# Patient Record
Sex: FEMALE | Race: WHITE | NOT HISPANIC OR LATINO
[De-identification: names, ages, dates, MRNs, and addresses within clinical notes are randomized per-mention and may not be internally consistent; named-entity substitution may affect disease eponyms.]

---

## 2017-01-11 ENCOUNTER — APPOINTMENT (OUTPATIENT)
Dept: INTERNAL MEDICINE | Facility: CLINIC | Age: 67
End: 2017-01-11

## 2017-01-11 VITALS
BODY MASS INDEX: 22.45 KG/M2 | DIASTOLIC BLOOD PRESSURE: 78 MMHG | HEIGHT: 62 IN | TEMPERATURE: 97.8 F | SYSTOLIC BLOOD PRESSURE: 136 MMHG | OXYGEN SATURATION: 92 % | HEART RATE: 74 BPM | WEIGHT: 122 LBS

## 2017-04-17 ENCOUNTER — APPOINTMENT (OUTPATIENT)
Dept: INTERNAL MEDICINE | Facility: CLINIC | Age: 67
End: 2017-04-17

## 2017-04-17 VITALS
BODY MASS INDEX: 20.61 KG/M2 | WEIGHT: 112 LBS | SYSTOLIC BLOOD PRESSURE: 132 MMHG | HEART RATE: 70 BPM | HEIGHT: 62 IN | TEMPERATURE: 98.5 F | OXYGEN SATURATION: 92 % | DIASTOLIC BLOOD PRESSURE: 79 MMHG

## 2017-04-17 DIAGNOSIS — N39.0 URINARY TRACT INFECTION, SITE NOT SPECIFIED: ICD-10-CM

## 2017-04-17 DIAGNOSIS — R10.30 LOWER ABDOMINAL PAIN, UNSPECIFIED: ICD-10-CM

## 2017-04-17 RX ORDER — AZITHROMYCIN 250 MG/1
250 TABLET, FILM COATED ORAL
Qty: 6 | Refills: 2 | Status: DISCONTINUED | COMMUNITY
Start: 2017-01-11 | End: 2017-04-17

## 2017-04-18 LAB
ALBUMIN SERPL ELPH-MCNC: 5 G/DL
ALP BLD-CCNC: 76 U/L
ALT SERPL-CCNC: 19 U/L
ANION GAP SERPL CALC-SCNC: 16 MMOL/L
AST SERPL-CCNC: 21 U/L
BASOPHILS # BLD AUTO: 0.04 K/UL
BASOPHILS NFR BLD AUTO: 0.5 %
BILIRUB SERPL-MCNC: 0.4 MG/DL
BUN SERPL-MCNC: 32 MG/DL
CALCIUM SERPL-MCNC: 9.4 MG/DL
CHLORIDE SERPL-SCNC: 103 MMOL/L
CO2 SERPL-SCNC: 24 MMOL/L
CREAT SERPL-MCNC: 0.78 MG/DL
CRP SERPL-MCNC: <0.2 MG/DL
EOSINOPHIL # BLD AUTO: 0.1 K/UL
EOSINOPHIL NFR BLD AUTO: 1.2 %
ERYTHROCYTE [SEDIMENTATION RATE] IN BLOOD BY WESTERGREN METHOD: 5 MM/HR
GLUCOSE SERPL-MCNC: 97 MG/DL
HCT VFR BLD CALC: 40.6 %
HGB BLD-MCNC: 13.7 G/DL
IMM GRANULOCYTES NFR BLD AUTO: 0.2 %
LYMPHOCYTES # BLD AUTO: 2.54 K/UL
LYMPHOCYTES NFR BLD AUTO: 29.8 %
MAN DIFF?: NORMAL
MCHC RBC-ENTMCNC: 32.6 PG
MCHC RBC-ENTMCNC: 33.7 GM/DL
MCV RBC AUTO: 96.7 FL
MONOCYTES # BLD AUTO: 0.57 K/UL
MONOCYTES NFR BLD AUTO: 6.7 %
NEUTROPHILS # BLD AUTO: 5.25 K/UL
NEUTROPHILS NFR BLD AUTO: 61.6 %
PLATELET # BLD AUTO: 240 K/UL
POTASSIUM SERPL-SCNC: 4.5 MMOL/L
PROT SERPL-MCNC: 6.7 G/DL
RBC # BLD: 4.2 M/UL
RBC # FLD: 12.1 %
SODIUM SERPL-SCNC: 143 MMOL/L
WBC # FLD AUTO: 8.52 K/UL

## 2017-05-11 ENCOUNTER — APPOINTMENT (OUTPATIENT)
Dept: OPHTHALMOLOGY | Facility: CLINIC | Age: 67
End: 2017-05-11

## 2017-06-07 ENCOUNTER — APPOINTMENT (OUTPATIENT)
Dept: OPHTHALMOLOGY | Facility: CLINIC | Age: 67
End: 2017-06-07

## 2017-06-12 ENCOUNTER — APPOINTMENT (OUTPATIENT)
Dept: ULTRASOUND IMAGING | Facility: IMAGING CENTER | Age: 67
End: 2017-06-12

## 2017-06-12 ENCOUNTER — APPOINTMENT (OUTPATIENT)
Dept: MAMMOGRAPHY | Facility: IMAGING CENTER | Age: 67
End: 2017-06-12

## 2017-06-12 ENCOUNTER — OUTPATIENT (OUTPATIENT)
Dept: OUTPATIENT SERVICES | Facility: HOSPITAL | Age: 67
LOS: 1 days | End: 2017-06-12
Payer: COMMERCIAL

## 2017-06-12 DIAGNOSIS — Z01.419 ENCOUNTER FOR GYNECOLOGICAL EXAMINATION (GENERAL) (ROUTINE) WITHOUT ABNORMAL FINDINGS: ICD-10-CM

## 2017-06-12 PROCEDURE — 77067 SCR MAMMO BI INCL CAD: CPT

## 2017-06-12 PROCEDURE — 77063 BREAST TOMOSYNTHESIS BI: CPT

## 2017-06-12 PROCEDURE — 76641 ULTRASOUND BREAST COMPLETE: CPT

## 2017-09-14 ENCOUNTER — APPOINTMENT (OUTPATIENT)
Dept: OPHTHALMOLOGY | Facility: CLINIC | Age: 67
End: 2017-09-14
Payer: COMMERCIAL

## 2017-09-14 DIAGNOSIS — H52.13 MYOPIA, BILATERAL: ICD-10-CM

## 2017-09-14 DIAGNOSIS — Z97.3 PRESENCE OF SPECTACLES AND CONTACT LENSES: ICD-10-CM

## 2017-09-14 DIAGNOSIS — H04.123 DRY EYE SYNDROME OF BILATERAL LACRIMAL GLANDS: ICD-10-CM

## 2017-09-14 PROCEDURE — 92012 INTRM OPH EXAM EST PATIENT: CPT

## 2017-09-14 PROCEDURE — 92285 EXTERNAL OCULAR PHOTOGRAPHY: CPT

## 2017-10-04 ENCOUNTER — APPOINTMENT (OUTPATIENT)
Dept: OPHTHALMOLOGY | Facility: CLINIC | Age: 67
End: 2017-10-04
Payer: COMMERCIAL

## 2017-10-04 PROCEDURE — 92012 INTRM OPH EXAM EST PATIENT: CPT

## 2018-03-08 ENCOUNTER — APPOINTMENT (OUTPATIENT)
Dept: OPHTHALMOLOGY | Facility: CLINIC | Age: 68
End: 2018-03-08
Payer: COMMERCIAL

## 2018-03-08 PROCEDURE — 92012 INTRM OPH EXAM EST PATIENT: CPT

## 2018-06-27 ENCOUNTER — APPOINTMENT (OUTPATIENT)
Dept: MAMMOGRAPHY | Facility: IMAGING CENTER | Age: 68
End: 2018-06-27
Payer: COMMERCIAL

## 2018-06-27 ENCOUNTER — APPOINTMENT (OUTPATIENT)
Dept: ULTRASOUND IMAGING | Facility: IMAGING CENTER | Age: 68
End: 2018-06-27
Payer: COMMERCIAL

## 2018-06-27 ENCOUNTER — OUTPATIENT (OUTPATIENT)
Dept: OUTPATIENT SERVICES | Facility: HOSPITAL | Age: 68
LOS: 1 days | End: 2018-06-27
Payer: COMMERCIAL

## 2018-06-27 DIAGNOSIS — Z00.8 ENCOUNTER FOR OTHER GENERAL EXAMINATION: ICD-10-CM

## 2018-06-27 PROCEDURE — 77067 SCR MAMMO BI INCL CAD: CPT | Mod: 26

## 2018-06-27 PROCEDURE — 77063 BREAST TOMOSYNTHESIS BI: CPT | Mod: 26

## 2018-06-27 PROCEDURE — 77067 SCR MAMMO BI INCL CAD: CPT

## 2018-06-27 PROCEDURE — 76641 ULTRASOUND BREAST COMPLETE: CPT | Mod: 26,50

## 2018-06-27 PROCEDURE — 76641 ULTRASOUND BREAST COMPLETE: CPT

## 2018-06-27 PROCEDURE — 77063 BREAST TOMOSYNTHESIS BI: CPT

## 2018-10-16 ENCOUNTER — APPOINTMENT (OUTPATIENT)
Dept: INTERNAL MEDICINE | Facility: CLINIC | Age: 68
End: 2018-10-16
Payer: COMMERCIAL

## 2018-10-16 VITALS
DIASTOLIC BLOOD PRESSURE: 75 MMHG | OXYGEN SATURATION: 97 % | TEMPERATURE: 98.4 F | BODY MASS INDEX: 21.71 KG/M2 | HEART RATE: 66 BPM | SYSTOLIC BLOOD PRESSURE: 128 MMHG | WEIGHT: 118 LBS | HEIGHT: 62 IN

## 2018-10-16 PROCEDURE — 99214 OFFICE O/P EST MOD 30 MIN: CPT

## 2018-10-16 NOTE — PLAN
[FreeTextEntry1] : URI\par -start zpak, take with meals and also may use probiotics to help with any GI effects from med\par -hand hygeine, rest, motrin/tylenol for symptomatic relief\par -rest and hydration\par \par Conjunctivitis\par -antibiotic eyedrops for 5 days\par -avoid wearing contacts

## 2018-10-16 NOTE — PHYSICAL EXAM
[No Acute Distress] : no acute distress [Well Nourished] : well nourished [PERRL] : pupils equal round and reactive to light [Normal Outer Ear/Nose] : the outer ears and nose were normal in appearance [Normal Oropharynx] : the oropharynx was normal [Supple] : supple [No Respiratory Distress] : no respiratory distress  [Clear to Auscultation] : lungs were clear to auscultation bilaterally [No Accessory Muscle Use] : no accessory muscle use [Normal Rate] : normal rate  [Regular Rhythm] : with a regular rhythm [No Focal Deficits] : no focal deficits [de-identified] : bilateral injected conjunctiva

## 2018-10-16 NOTE — REVIEW OF SYSTEMS
[Fever] : no fever [Chills] : chills [Fatigue] : no fatigue [Discharge] : discharge [Redness] : redness [Earache] : earache [Nasal Discharge] : nasal discharge [Sore Throat] : sore throat [Chest Pain] : no chest pain [Palpitations] : no palpitations [Shortness Of Breath] : no shortness of breath [Wheezing] : no wheezing [Cough] : cough [Abdominal Pain] : no abdominal pain [Nausea] : no nausea [Constipation] : no constipation [Diarrhea] : diarrhea [Vomiting] : no vomiting [Headache] : no headache [Dizziness] : no dizziness

## 2018-10-16 NOTE — HISTORY OF PRESENT ILLNESS
[FreeTextEntry8] : Patient presents with dry cough, nasal congestion, and chest congestion for the past week. She states that the symptoms, especially cough are getting worse. She admits to feeling chills last night. She used OTC claritin with mild sympotmatic relief. Also now complains of a little sore throat from the coughing. Denies sick contacts. States that she had her makeup done last week and they used false eyelashes with glue which has caused her eyes to become red. This morning she woke up with crusted eyes and they continue to ooze discharge.

## 2019-04-22 ENCOUNTER — APPOINTMENT (OUTPATIENT)
Dept: INTERNAL MEDICINE | Facility: CLINIC | Age: 69
End: 2019-04-22
Payer: COMMERCIAL

## 2019-04-22 ENCOUNTER — NON-APPOINTMENT (OUTPATIENT)
Age: 69
End: 2019-04-22

## 2019-04-22 VITALS
HEART RATE: 72 BPM | SYSTOLIC BLOOD PRESSURE: 118 MMHG | WEIGHT: 118 LBS | DIASTOLIC BLOOD PRESSURE: 78 MMHG | BODY MASS INDEX: 21.71 KG/M2 | HEIGHT: 62 IN

## 2019-04-22 DIAGNOSIS — Z86.69 PERSONAL HISTORY OF OTHER DISEASES OF THE NERVOUS SYSTEM AND SENSE ORGANS: ICD-10-CM

## 2019-04-22 DIAGNOSIS — H00.11 CHALAZION RIGHT UPPER EYELID: ICD-10-CM

## 2019-04-22 DIAGNOSIS — M25.512 PAIN IN RIGHT SHOULDER: ICD-10-CM

## 2019-04-22 DIAGNOSIS — H40.003 PREGLAUCOMA, UNSPECIFIED, BILATERAL: ICD-10-CM

## 2019-04-22 DIAGNOSIS — Z01.818 ENCOUNTER FOR OTHER PREPROCEDURAL EXAMINATION: ICD-10-CM

## 2019-04-22 DIAGNOSIS — M25.511 PAIN IN RIGHT SHOULDER: ICD-10-CM

## 2019-04-22 DIAGNOSIS — H00.14 CHALAZION LEFT UPPER EYELID: ICD-10-CM

## 2019-04-22 DIAGNOSIS — Z41.1 ENCOUNTER FOR COSMETIC SURGERY: ICD-10-CM

## 2019-04-22 PROCEDURE — 36415 COLL VENOUS BLD VENIPUNCTURE: CPT

## 2019-04-22 PROCEDURE — 99214 OFFICE O/P EST MOD 30 MIN: CPT | Mod: 25

## 2019-04-22 PROCEDURE — 93000 ELECTROCARDIOGRAM COMPLETE: CPT

## 2019-04-22 NOTE — HISTORY OF PRESENT ILLNESS
[No Pertinent Cardiac History] : no history of aortic stenosis, atrial fibrillation, coronary artery disease, recent myocardial infarction, or implantable device/pacemaker [Aortic Stenosis] : no aortic stenosis [Coronary Artery Disease] : no coronary artery disease [No Pertinent Pulmonary History] : no history of asthma, COPD, sleep apnea, or smoking [No Adverse Anesthesia Reaction] : no adverse anesthesia reaction in self or family member [Chronic Kidney Disease] : no chronic kidney disease [Chronic Anticoagulation] : no chronic anticoagulation [FreeTextEntry1] : eye.facial [Diabetes] : no diabetes [FreeTextEntry2] : 5/14 [FreeTextEntry3] : Dr Santos [FreeTextEntry4] : Having eye/facial surgery

## 2019-04-22 NOTE — PHYSICAL EXAM
[No Acute Distress] : no acute distress [Well Nourished] : well nourished [No JVD] : no jugular venous distention [Supple] : supple [No Respiratory Distress] : no respiratory distress  [Clear to Auscultation] : lungs were clear to auscultation bilaterally [Normal Rate] : normal rate  [Regular Rhythm] : with a regular rhythm [No Extremity Clubbing/Cyanosis] : no extremity clubbing/cyanosis [No Edema] : there was no peripheral edema [No HSM] : no HSM [Normal Bowel Sounds] : normal bowel sounds

## 2019-04-22 NOTE — ASSESSMENT
[No Further Testing Recommended] : no further testing recommended [Patient Optimized for Surgery] : Patient optimized for surgery

## 2019-04-22 NOTE — REVIEW OF SYSTEMS
[Fever] : no fever [Night Sweats] : no night sweats [Nasal Discharge] : no nasal discharge [Earache] : no earache [Chest Pain] : no chest pain [Orthopnea] : no orthopnea [Shortness Of Breath] : no shortness of breath [Wheezing] : no wheezing [Abdominal Pain] : no abdominal pain [Dysuria] : no dysuria [Vomiting] : no vomiting

## 2019-04-23 LAB
ALBUMIN SERPL ELPH-MCNC: 4.7 G/DL
ALP BLD-CCNC: 91 U/L
ALT SERPL-CCNC: 16 U/L
ANION GAP SERPL CALC-SCNC: 14 MMOL/L
AST SERPL-CCNC: 17 U/L
BASOPHILS # BLD AUTO: 0.06 K/UL
BASOPHILS NFR BLD AUTO: 0.8 %
BILIRUB SERPL-MCNC: 0.3 MG/DL
BUN SERPL-MCNC: 20 MG/DL
CALCIUM SERPL-MCNC: 9.4 MG/DL
CHLORIDE SERPL-SCNC: 104 MMOL/L
CO2 SERPL-SCNC: 26 MMOL/L
CREAT SERPL-MCNC: 0.85 MG/DL
EOSINOPHIL # BLD AUTO: 0.11 K/UL
EOSINOPHIL NFR BLD AUTO: 1.4 %
ESTIMATED AVERAGE GLUCOSE: 114 MG/DL
GLUCOSE SERPL-MCNC: 94 MG/DL
HBA1C MFR BLD HPLC: 5.6 %
HCT VFR BLD CALC: 40.2 %
HGB BLD-MCNC: 13.1 G/DL
IMM GRANULOCYTES NFR BLD AUTO: 0.5 %
LYMPHOCYTES # BLD AUTO: 2.24 K/UL
LYMPHOCYTES NFR BLD AUTO: 28.5 %
MAN DIFF?: NORMAL
MCHC RBC-ENTMCNC: 31.9 PG
MCHC RBC-ENTMCNC: 32.6 GM/DL
MCV RBC AUTO: 97.8 FL
MONOCYTES # BLD AUTO: 0.74 K/UL
MONOCYTES NFR BLD AUTO: 9.4 %
NEUTROPHILS # BLD AUTO: 4.67 K/UL
NEUTROPHILS NFR BLD AUTO: 59.4 %
PLATELET # BLD AUTO: 229 K/UL
POTASSIUM SERPL-SCNC: 4.3 MMOL/L
PROT SERPL-MCNC: 6.7 G/DL
RBC # BLD: 4.11 M/UL
RBC # FLD: 12.1 %
SODIUM SERPL-SCNC: 144 MMOL/L
TSH SERPL-ACNC: 2.45 UIU/ML
WBC # FLD AUTO: 7.86 K/UL

## 2019-05-14 ENCOUNTER — OUTPATIENT (OUTPATIENT)
Dept: OUTPATIENT SERVICES | Facility: HOSPITAL | Age: 69
LOS: 1 days | Discharge: HOME | End: 2019-05-14

## 2019-06-28 ENCOUNTER — APPOINTMENT (OUTPATIENT)
Dept: MAMMOGRAPHY | Facility: IMAGING CENTER | Age: 69
End: 2019-06-28
Payer: COMMERCIAL

## 2019-06-28 ENCOUNTER — OUTPATIENT (OUTPATIENT)
Dept: OUTPATIENT SERVICES | Facility: HOSPITAL | Age: 69
LOS: 1 days | End: 2019-06-28
Payer: COMMERCIAL

## 2019-06-28 ENCOUNTER — APPOINTMENT (OUTPATIENT)
Dept: ULTRASOUND IMAGING | Facility: IMAGING CENTER | Age: 69
End: 2019-06-28
Payer: COMMERCIAL

## 2019-06-28 DIAGNOSIS — Z00.8 ENCOUNTER FOR OTHER GENERAL EXAMINATION: ICD-10-CM

## 2019-06-28 PROCEDURE — 77067 SCR MAMMO BI INCL CAD: CPT

## 2019-06-28 PROCEDURE — 77063 BREAST TOMOSYNTHESIS BI: CPT | Mod: 26

## 2019-06-28 PROCEDURE — 76641 ULTRASOUND BREAST COMPLETE: CPT | Mod: 26,50

## 2019-06-28 PROCEDURE — 77067 SCR MAMMO BI INCL CAD: CPT | Mod: 26

## 2019-06-28 PROCEDURE — 76641 ULTRASOUND BREAST COMPLETE: CPT

## 2019-06-28 PROCEDURE — 77063 BREAST TOMOSYNTHESIS BI: CPT

## 2019-09-20 ENCOUNTER — EMERGENCY (EMERGENCY)
Facility: HOSPITAL | Age: 69
LOS: 1 days | Discharge: HOME | End: 2019-09-20
Attending: EMERGENCY MEDICINE
Payer: COMMERCIAL

## 2019-09-20 VITALS
SYSTOLIC BLOOD PRESSURE: 120 MMHG | DIASTOLIC BLOOD PRESSURE: 78 MMHG | HEART RATE: 58 BPM | HEIGHT: 63 IN | RESPIRATION RATE: 16 BRPM | OXYGEN SATURATION: 96 % | TEMPERATURE: 98 F | WEIGHT: 119.93 LBS

## 2019-09-20 VITALS
RESPIRATION RATE: 16 BRPM | TEMPERATURE: 98 F | HEART RATE: 69 BPM | OXYGEN SATURATION: 94 % | DIASTOLIC BLOOD PRESSURE: 59 MMHG | SYSTOLIC BLOOD PRESSURE: 114 MMHG

## 2019-09-20 DIAGNOSIS — Z98.890 OTHER SPECIFIED POSTPROCEDURAL STATES: Chronic | ICD-10-CM

## 2019-09-20 PROCEDURE — 99284 EMERGENCY DEPT VISIT MOD MDM: CPT

## 2019-09-20 RX ORDER — FAMOTIDINE 10 MG/ML
20 INJECTION INTRAVENOUS ONCE
Refills: 0 | Status: COMPLETED | OUTPATIENT
Start: 2019-09-20 | End: 2019-09-20

## 2019-09-20 RX ORDER — DIPHENHYDRAMINE HCL 50 MG
50 CAPSULE ORAL ONCE
Refills: 0 | Status: COMPLETED | OUTPATIENT
Start: 2019-09-20 | End: 2019-09-20

## 2019-09-20 RX ADMIN — Medication 125 MILLIGRAM(S): at 22:52

## 2019-09-20 RX ADMIN — Medication 50 MILLIGRAM(S): at 22:52

## 2019-09-20 RX ADMIN — FAMOTIDINE 20 MILLIGRAM(S): 10 INJECTION INTRAVENOUS at 22:52

## 2019-09-20 NOTE — ED PROVIDER NOTE - PATIENT PORTAL LINK FT
You can access the FollowMyHealth Patient Portal offered by [facility] by registering at the following website: [email or web address]. By joining [facility]’s FollowMyHealth portal, you will also be able to view your health information using other applications (apps) compatible with our system.

## 2019-09-20 NOTE — ED ADULT NURSE NOTE - OBJECTIVE STATEMENT
70yo female BIBA with nausea and near syncope tonight. Pt was eating tuna at dinner. When she got up to leave she began to feel very nauseous. Pt went to the bathroom and began to feel very lightheaded. Pt then laid down on the floor and EMS was called. Pt denies LOC, cp, sob. On arrival to ED, pt A&Ox3. SAUER. Respirations even/unlabored. Abd soft. Pt reports feeling "uneasy". No n/v/d at present. denies urinary symptoms. Skin WDI.

## 2019-09-20 NOTE — ED PROVIDER NOTE - SKIN, MLM
Skin normal color for race, warm, dry and intact. No evidence of rash. flashed red skin with urticaria

## 2019-09-20 NOTE — ED PROVIDER NOTE - CLINICAL SUMMARY MEDICAL DECISION MAKING FREE TEXT BOX
68 y/o female with no significant past medical history presents with nausea and quizziness.  Patient recently ate at an outside restaurant and consumed undercooked tuna.  Currently, patient is clinically improving.  Administered solumedrol 125 mcg, famotidine 20 mg and diphenhydramine 50 mg.  Patient is not reporting any angioedema, tongue swelling or any respiratory issues.  Will continue to monitor and re-evaluate. 70 y/o female with acute allergic reaction after eating not well cooked tuna ,no respiratory distress ,lungs are clear .iv hydration ,benadryl ,steroids, pepsid and on reevaluation: ZR 70 y/o female with acute allergic reaction after eating not well cooked tuna ,no respiratory distress ,lungs are clear .iv hydration ,benadryl ,steroids, pepsid and on reevaluation patient is medically clear for discharge. 68 y/o female with acute allergic reaction after eating not well cooked tuna ,no respiratory distress ,lungs are clear .iv hydration ,benadryl ,steroids,  pepsid  and observation for respiratory problem ,  ZR

## 2019-09-20 NOTE — ED PROVIDER NOTE - OBJECTIVE STATEMENT
68 y/o female with no significant past medical history presents with nausea and quizziness.  Patient recently ate at an outside restaurant and consumed undercooked tuna.  Patient has a known allergy to shellfish which causes angioedema.  She ate dinner around 8:30pm and within 30 minutes of dinner started to feel nauseous, queasiness and faintness.  She denies vomiting, respiratory issues, tongue swelling, angioedema.  She noticed that her face and upper chest area started to get red in color.  Patient denies diarrhea or any other acute GI symptoms.    Currently patient feels clinically improved with redness on her face and upper chest dissipating. 68 y/o female with no significant past medical history presents with nausea and dizziness.  Patient recently ate at an outside restaurant and consumed undercooked tuna.  Patient has a known allergy to shellfish which causes angioedema.  She ate dinner around 8:30pm and within 30 minutes of dinner started to feel nauseous, queasiness and faintness.  She denies vomiting, respiratory issues, tongue swelling, angioedema.  She noticed that her face and upper chest area started to get red in color.  Patient denies diarrhea or any other acute GI symptoms.    Currently patient feels clinically improved with redness on her face and upper chest dissipating.

## 2019-09-20 NOTE — ED PROVIDER NOTE - PROGRESS NOTE DETAILS
Administered solumedrol, benadryl and famotidine. Patient clinically improving.  Will continue to observe and plan to discharge on prednisone 50 mg X 4 days and famotidine if medically cleared.

## 2019-09-20 NOTE — ED PROVIDER NOTE - CARE PLAN
Principal Discharge DX:	Allergic reaction  Assessment and plan of treatment:	-Patient experiencing allergic reaction after consuming undercooked tuna; has known shellfish allergy.  -Ordered solumedrol 125 mcg, famotidine 20 mg, and diphenhydramine 50mg.  -Continue to re-evaluate.

## 2019-09-20 NOTE — ED PROVIDER NOTE - NSFOLLOWUPINSTRUCTIONS_ED_ALL_ED_FT
Please follow up with your primary care physician and an allergist physician.  Please take the steroid taper and benadryl.  You are medically clear for discharge.

## 2019-09-20 NOTE — ED PROVIDER NOTE - PLAN OF CARE
-Patient experiencing allergic reaction after consuming undercooked tuna; has known shellfish allergy.  -Ordered solumedrol 125 mcg, famotidine 20 mg, and diphenhydramine 50mg.  -Continue to re-evaluate.

## 2019-09-21 PROCEDURE — 99284 EMERGENCY DEPT VISIT MOD MDM: CPT | Mod: 25

## 2019-09-21 PROCEDURE — 96375 TX/PRO/DX INJ NEW DRUG ADDON: CPT

## 2019-09-21 PROCEDURE — 96374 THER/PROPH/DIAG INJ IV PUSH: CPT

## 2019-09-21 RX ORDER — ACETAMINOPHEN 500 MG
650 TABLET ORAL ONCE
Refills: 0 | Status: COMPLETED | OUTPATIENT
Start: 2019-09-21 | End: 2019-09-21

## 2019-09-21 RX ORDER — DIPHENHYDRAMINE HCL 50 MG
1 CAPSULE ORAL
Qty: 0 | Refills: 0 | DISCHARGE
Start: 2019-09-21 | End: 2019-09-24

## 2019-09-21 RX ADMIN — Medication 650 MILLIGRAM(S): at 03:07

## 2019-09-30 ENCOUNTER — TRANSCRIPTION ENCOUNTER (OUTPATIENT)
Age: 69
End: 2019-09-30

## 2020-06-11 PROBLEM — Z78.9 OTHER SPECIFIED HEALTH STATUS: Chronic | Status: ACTIVE | Noted: 2019-09-20

## 2020-06-30 ENCOUNTER — APPOINTMENT (OUTPATIENT)
Dept: ULTRASOUND IMAGING | Facility: IMAGING CENTER | Age: 70
End: 2020-06-30
Payer: COMMERCIAL

## 2020-06-30 ENCOUNTER — APPOINTMENT (OUTPATIENT)
Dept: MAMMOGRAPHY | Facility: IMAGING CENTER | Age: 70
End: 2020-06-30
Payer: COMMERCIAL

## 2020-06-30 ENCOUNTER — OUTPATIENT (OUTPATIENT)
Dept: OUTPATIENT SERVICES | Facility: HOSPITAL | Age: 70
LOS: 1 days | End: 2020-06-30
Payer: COMMERCIAL

## 2020-06-30 DIAGNOSIS — Z98.890 OTHER SPECIFIED POSTPROCEDURAL STATES: Chronic | ICD-10-CM

## 2020-06-30 DIAGNOSIS — Z00.8 ENCOUNTER FOR OTHER GENERAL EXAMINATION: ICD-10-CM

## 2020-06-30 PROCEDURE — 77063 BREAST TOMOSYNTHESIS BI: CPT | Mod: 26

## 2020-06-30 PROCEDURE — 76641 ULTRASOUND BREAST COMPLETE: CPT | Mod: 26,50

## 2020-06-30 PROCEDURE — 77067 SCR MAMMO BI INCL CAD: CPT | Mod: 26

## 2020-06-30 PROCEDURE — 76641 ULTRASOUND BREAST COMPLETE: CPT

## 2020-06-30 PROCEDURE — 77067 SCR MAMMO BI INCL CAD: CPT

## 2020-06-30 PROCEDURE — 77063 BREAST TOMOSYNTHESIS BI: CPT

## 2020-07-07 ENCOUNTER — APPOINTMENT (OUTPATIENT)
Dept: UROGYNECOLOGY | Facility: CLINIC | Age: 70
End: 2020-07-07
Payer: COMMERCIAL

## 2020-07-07 VITALS
BODY MASS INDEX: 21.71 KG/M2 | HEIGHT: 62 IN | HEART RATE: 80 BPM | WEIGHT: 118 LBS | DIASTOLIC BLOOD PRESSURE: 85 MMHG | SYSTOLIC BLOOD PRESSURE: 150 MMHG | TEMPERATURE: 98.8 F

## 2020-07-07 DIAGNOSIS — Z80.0 FAMILY HISTORY OF MALIGNANT NEOPLASM OF DIGESTIVE ORGANS: ICD-10-CM

## 2020-07-07 DIAGNOSIS — Z87.01 PERSONAL HISTORY OF PNEUMONIA (RECURRENT): ICD-10-CM

## 2020-07-07 LAB
BILIRUB UR QL STRIP: NORMAL
CLARITY UR: CLEAR
COLLECTION METHOD: NORMAL
GLUCOSE UR-MCNC: NORMAL
HCG UR QL: 0.2 EU/DL
HGB UR QL STRIP.AUTO: NORMAL
KETONES UR-MCNC: NORMAL
LEUKOCYTE ESTERASE UR QL STRIP: NORMAL
NITRITE UR QL STRIP: NORMAL
PH UR STRIP: 5.5
PROT UR STRIP-MCNC: NORMAL
SP GR UR STRIP: 1.02

## 2020-07-07 PROCEDURE — 81003 URINALYSIS AUTO W/O SCOPE: CPT | Mod: QW

## 2020-07-07 PROCEDURE — 99204 OFFICE O/P NEW MOD 45 MIN: CPT

## 2020-07-07 NOTE — REASON FOR VISIT
[Initial Visit ___] : an initial visit for [unfilled] [Questionnaire Received] : Patient questionnaire received [Intake Form Reviewed] : Patient intake form with past medical history, surgical history, family history and social history reviewed today [Recurrent Urinary Infections] : recurrent urinary infections

## 2020-07-07 NOTE — DISCUSSION/SUMMARY
[FreeTextEntry1] : 70  (LMP 2005) presenting for frequent UTIs, vaginal atrophy and urinary urgency.  We discussed behavioral modification techniques to prevent recurrent UTIs, including probiotics, cranberry tablets and increased hydration. Risks and benefits of vaginal estrogen discussed with patient.  Patient desires to start vaginal estrogen, prescribed.  Requested that patient return to our office if further symptoms for cath for sterile urine collection.  Discussed urinary urgency, and recommended addressing frequent UTIs with behavioral changes and vaginal estrogen first then will perform further evaluation as needed if urgency symptoms persist. IUGA information on UTI and vaginal estrogen given to patient.\par \par Patient to start behavioral changes and vaginal estrogen and follow up in two months.  Understands need to follow up in office for straight catheterization if recurrent symptoms or concern of a urinary tract infection.

## 2020-07-07 NOTE — HISTORY OF PRESENT ILLNESS
[Uterine Prolapse] : no [Stress Incontinence] : no [Unable To Restrain Bowel Movement] : mild [Feelings Of Urinary Urgency] : no [x1] : nocturia once nightly [Urinary Tract Infection] : mild [Hematuria] : no [Incomplete Emptying Of Stool] : no [Stool Visible Blood] : mild [Cystocele (Obstetric)] : mild [Vaginal Wall Prolapse] : no [Rectal Prolapse] : no [Urinary Frequency] : no [] : no [de-identified] : with a urinary tract infection [de-identified] : not bothersome to patient, rare [de-identified] : occasional but bothersome [de-identified] : 2 [de-identified] : 5 [de-identified] : Not sexually active due to recurrent UTIs [FreeTextEntry1] : 70  (LMP ) presenting for multiple UTIs, brings records as below.  States she has always had infections 1-2 times per year, no increase since menopause.  Reports vaginal dryness.  Was prescribed Vagifem by her Obgyn Dr. Barrett but did not start it.  After recent UTI with symptoms starting on  patient took cipro which she had at home for 7 days without resolution of symptoms, following saw her ObGyn, had a culture as below, was prescribed Monurol, took on 20, with resolution of symptoms a few days after.  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

## 2020-07-07 NOTE — LETTER BODY
[Dear  ___] : Dear  [unfilled], [I had the pleasure of evaluating your patient, [unfilled]. Thank you for referring Ms. [unfilled] for consultation for ___] : I had the pleasure of evaluating your patient, [unfilled]. Thank you for referring Ms. [unfilled] for consultation for [unfilled]. [Attached please find my note.] : Attached please find my note. [Thank you very much for allowing me to participate in the care of this patient. If you have any questions, please do not hesitate to contact me] : Thank you very much for allowing me to participate in the care of this patient. If you have any questions, please do not hesitate to contact me. [Dear  ___] : Dear ~AILEEN,

## 2020-07-07 NOTE — PHYSICAL EXAM
[Chaperone Present] : A chaperone was present in the examining room during all aspects of the physical examination [No Acute Distress] : in no acute distress [Well developed] : well developed [Oriented x3] : oriented to person, place, and time [Well Nourished] : ~L well nourished [Normal Mood/Affect] : mood and affect are normal [Normal Memory] : ~T memory was ~L unimpaired [Warm and Dry] : was warm and dry to touch [Normal Gait] : gait was normal [Labia Majora] : were normal [Labia Minora] : were normal [No Bleeding] : there was no active vaginal bleeding [Normal Appearance] : general appearance was normal [Normal] : no abnormalities [Normal rectal exam] : was normal [Vulvar Atrophy] : vulvar atrophy [Atrophy] : atrophy [3] : 3 [Absent] : absent [Post Void Residual ____ml] : post void residual was [unfilled] ml [Mass (___ Cm)] : no ~M [unfilled] abdominal mass was palpated [Distended] : not distended [Tenderness] : ~T no ~M abdominal tenderness observed [Inguinal LAD] : no adenopathy was noted in the inguinal lymph nodes [de-identified] : No prolapse

## 2020-07-08 ENCOUNTER — RECORD ABSTRACTING (OUTPATIENT)
Age: 70
End: 2020-07-08

## 2020-07-08 LAB
APPEARANCE: CLEAR
BACTERIA: NEGATIVE
BILIRUBIN URINE: NEGATIVE
BLOOD URINE: ABNORMAL
COLOR: YELLOW
GLUCOSE QUALITATIVE U: NEGATIVE
HYALINE CASTS: 0 /LPF
KETONES URINE: NEGATIVE
LEUKOCYTE ESTERASE URINE: NEGATIVE
MICROSCOPIC-UA: NORMAL
NITRITE URINE: NEGATIVE
PH URINE: 6
PROTEIN URINE: NEGATIVE
RED BLOOD CELLS URINE: 0 /HPF
SPECIFIC GRAVITY URINE: 1.02
SQUAMOUS EPITHELIAL CELLS: 0 /HPF
UROBILINOGEN URINE: NORMAL
WHITE BLOOD CELLS URINE: 0 /HPF

## 2020-07-09 ENCOUNTER — RECORD ABSTRACTING (OUTPATIENT)
Age: 70
End: 2020-07-09

## 2020-07-09 LAB — BACTERIA UR CULT: NORMAL

## 2020-07-28 ENCOUNTER — NON-APPOINTMENT (OUTPATIENT)
Age: 70
End: 2020-07-28

## 2020-07-28 ENCOUNTER — APPOINTMENT (OUTPATIENT)
Dept: INTERNAL MEDICINE | Facility: CLINIC | Age: 70
End: 2020-07-28
Payer: COMMERCIAL

## 2020-07-28 VITALS
BODY MASS INDEX: 21.9 KG/M2 | WEIGHT: 119 LBS | DIASTOLIC BLOOD PRESSURE: 96 MMHG | HEIGHT: 62 IN | OXYGEN SATURATION: 93 % | SYSTOLIC BLOOD PRESSURE: 162 MMHG | HEART RATE: 75 BPM | TEMPERATURE: 98.4 F

## 2020-07-28 VITALS — SYSTOLIC BLOOD PRESSURE: 150 MMHG | DIASTOLIC BLOOD PRESSURE: 80 MMHG

## 2020-07-28 LAB
BASOPHILS # BLD AUTO: 0.05 K/UL
BASOPHILS NFR BLD AUTO: 0.8 %
EOSINOPHIL # BLD AUTO: 0.14 K/UL
EOSINOPHIL NFR BLD AUTO: 2.4 %
HCT VFR BLD CALC: 41.7 %
HGB BLD-MCNC: 13.7 G/DL
IMM GRANULOCYTES NFR BLD AUTO: 0.5 %
LYMPHOCYTES # BLD AUTO: 1.8 K/UL
LYMPHOCYTES NFR BLD AUTO: 30.3 %
MAN DIFF?: NORMAL
MCHC RBC-ENTMCNC: 31.6 PG
MCHC RBC-ENTMCNC: 32.9 GM/DL
MCV RBC AUTO: 96.3 FL
MONOCYTES # BLD AUTO: 0.55 K/UL
MONOCYTES NFR BLD AUTO: 9.3 %
NEUTROPHILS # BLD AUTO: 3.37 K/UL
NEUTROPHILS NFR BLD AUTO: 56.7 %
PLATELET # BLD AUTO: 227 K/UL
RBC # BLD: 4.33 M/UL
RBC # FLD: 11.9 %
WBC # FLD AUTO: 5.94 K/UL

## 2020-07-28 PROCEDURE — 93000 ELECTROCARDIOGRAM COMPLETE: CPT | Mod: 59

## 2020-07-28 PROCEDURE — 36415 COLL VENOUS BLD VENIPUNCTURE: CPT

## 2020-07-28 PROCEDURE — 99214 OFFICE O/P EST MOD 30 MIN: CPT | Mod: 25

## 2020-07-28 RX ORDER — AZITHROMYCIN 250 MG/1
250 TABLET, FILM COATED ORAL DAILY
Qty: 1 | Refills: 0 | Status: DISCONTINUED | COMMUNITY
Start: 2018-10-16 | End: 2020-07-28

## 2020-07-28 NOTE — HISTORY OF PRESENT ILLNESS
[FreeTextEntry1] : BP management [de-identified] : Patient presents to discuss BPs which have been elevated at other providers offices over the past month. She gets headaches which are chronic, no recent change. She denies chest pain, difficulty breathing. She had oral surgery procedure 2 weeks ago; told that she may have been exposed to COVID. She had COVID test last week, results are pending.

## 2020-07-28 NOTE — PLAN
[FreeTextEntry1] : Elevated BP reading- whitecoat?\par -reading improved on repeat manual\par -she bought a BP machine to check home readings\par -recommend that she check home BPs for a week and call office to discuss next week\par -if remain borderline will start amlodipine\par -EKG largely unchanged from one done in ER Sep 2019 (seen in [facility])\par -check routine labs

## 2020-07-28 NOTE — PHYSICAL EXAM
[No Acute Distress] : no acute distress [Well Nourished] : well nourished [Well Developed] : well developed [No Respiratory Distress] : no respiratory distress  [No Accessory Muscle Use] : no accessory muscle use [Clear to Auscultation] : lungs were clear to auscultation bilaterally [Normal Rate] : normal rate  [Regular Rhythm] : with a regular rhythm [Normal S1, S2] : normal S1 and S2 [No Edema] : there was no peripheral edema [No Focal Deficits] : no focal deficits [Alert and Oriented x3] : oriented to person, place, and time

## 2020-07-29 LAB
25(OH)D3 SERPL-MCNC: 25.6 NG/ML
ALBUMIN SERPL ELPH-MCNC: 5.1 G/DL
ALP BLD-CCNC: 93 U/L
ALT SERPL-CCNC: 15 U/L
ANION GAP SERPL CALC-SCNC: 12 MMOL/L
AST SERPL-CCNC: 17 U/L
BILIRUB SERPL-MCNC: 0.4 MG/DL
BUN SERPL-MCNC: 19 MG/DL
CALCIUM SERPL-MCNC: 9.7 MG/DL
CHLORIDE SERPL-SCNC: 104 MMOL/L
CHOLEST SERPL-MCNC: 269 MG/DL
CHOLEST/HDLC SERPL: 3.5 RATIO
CO2 SERPL-SCNC: 25 MMOL/L
CREAT SERPL-MCNC: 0.76 MG/DL
ESTIMATED AVERAGE GLUCOSE: 111 MG/DL
GLUCOSE SERPL-MCNC: 111 MG/DL
HBA1C MFR BLD HPLC: 5.5 %
HDLC SERPL-MCNC: 77 MG/DL
LDLC SERPL CALC-MCNC: 179 MG/DL
POTASSIUM SERPL-SCNC: 4.3 MMOL/L
PROT SERPL-MCNC: 6.8 G/DL
SODIUM SERPL-SCNC: 141 MMOL/L
TRIGL SERPL-MCNC: 70 MG/DL
TSH SERPL-ACNC: 2.42 UIU/ML

## 2020-09-08 ENCOUNTER — APPOINTMENT (OUTPATIENT)
Dept: UROGYNECOLOGY | Facility: CLINIC | Age: 70
End: 2020-09-08
Payer: COMMERCIAL

## 2020-09-08 PROCEDURE — 99213 OFFICE O/P EST LOW 20 MIN: CPT

## 2020-09-08 RX ORDER — FOSFOMYCIN TROMETHAMINE 3 G/1
3 POWDER ORAL
Qty: 1 | Refills: 0 | Status: DISCONTINUED | COMMUNITY
Start: 2020-06-29

## 2020-09-08 RX ORDER — AMOXICILLIN 500 MG/1
500 CAPSULE ORAL
Qty: 21 | Refills: 0 | Status: DISCONTINUED | COMMUNITY
Start: 2020-08-25

## 2020-09-08 RX ORDER — FLUCONAZOLE 150 MG/1
150 TABLET ORAL
Qty: 1 | Refills: 0 | Status: DISCONTINUED | COMMUNITY
Start: 2020-06-29

## 2020-09-08 RX ORDER — PHENAZOPYRIDINE HYDROCHLORIDE 100 MG/1
100 TABLET ORAL
Qty: 6 | Refills: 0 | Status: DISCONTINUED | COMMUNITY
Start: 2020-06-24

## 2020-09-08 RX ORDER — ACETAMINOPHEN AND CODEINE PHOSPHATE 300; 15 MG/1; MG/1
300-15 TABLET ORAL
Qty: 10 | Refills: 0 | Status: DISCONTINUED | COMMUNITY
Start: 2020-07-13

## 2020-09-08 RX ORDER — CHLORHEXIDINE GLUCONATE, 0.12% ORAL RINSE 1.2 MG/ML
0.12 SOLUTION DENTAL
Qty: 473 | Refills: 0 | Status: DISCONTINUED | COMMUNITY
Start: 2020-08-25

## 2020-09-08 NOTE — PHYSICAL EXAM
[No Acute Distress] : in no acute distress [Well Nourished] : ~L well nourished [Good Hygeine] : demonstrates good hygeine [Well developed] : well developed [Oriented x3] : oriented to person, place, and time [Soft, Nontender] : the abdomen was soft and nontender [Normal Mood/Affect] : mood and affect are normal [Normal Gait] : gait was normal [Vulvar Atrophy] : vulvar atrophy [Warm and Dry] : was warm and dry to touch [Atrophy] : atrophy [Discharge] : a  ~M vaginal discharge was present [Scant] : scant [Clear] : clear [No Bleeding] : there was no active vaginal bleeding

## 2020-09-08 NOTE — DISCUSSION/SUMMARY
[FreeTextEntry1] : PT will continue use of vaginal tablet Yuvafem twice a week.  \par Discuss with pt regarding if she gets a UTI, to call office and need appt to get urine checked.  She is aware the difference between clean catch vs. cath urine.\par Follow up as needed.  If pt have any problems/concern to call office.  PT aware and agrees.

## 2020-09-08 NOTE — HISTORY OF PRESENT ILLNESS
[FreeTextEntry1] : PT presents to the office for follow up on vaginal atrophy and hx of recurrent UTI.  PT was Rx Yuvafem.  She's been using it since 7/7/2020 and feels it's been much better.  Denies any symptoms of UTI.\par She was placed on estrogen tablets in the past by her GYN and she used it and stopped.  She is aware now to continue use of the tablets to help with her vaginal atrophy and help to prevent recurrent UTI.

## 2020-11-10 ENCOUNTER — NON-APPOINTMENT (OUTPATIENT)
Age: 70
End: 2020-11-10

## 2020-11-11 ENCOUNTER — APPOINTMENT (OUTPATIENT)
Dept: UROGYNECOLOGY | Facility: CLINIC | Age: 70
End: 2020-11-11
Payer: COMMERCIAL

## 2020-11-11 LAB
BILIRUB UR QL STRIP: NORMAL
CLARITY UR: NORMAL
COLLECTION METHOD: NORMAL
GLUCOSE UR-MCNC: NORMAL
HCG UR QL: 0.2 EU/DL
HGB UR QL STRIP.AUTO: NORMAL
KETONES UR-MCNC: NORMAL
LEUKOCYTE ESTERASE UR QL STRIP: NORMAL
NITRITE UR QL STRIP: NORMAL
PH UR STRIP: 5
PROT UR STRIP-MCNC: 30
SP GR UR STRIP: 1

## 2020-11-11 PROCEDURE — 99072 ADDL SUPL MATRL&STAF TM PHE: CPT

## 2020-11-11 PROCEDURE — 99213 OFFICE O/P EST LOW 20 MIN: CPT

## 2020-11-11 PROCEDURE — 81003 URINALYSIS AUTO W/O SCOPE: CPT | Mod: QW

## 2020-11-11 NOTE — PHYSICAL EXAM
[No Acute Distress] : in no acute distress [Well Nourished] : ~L well nourished [Good Hygeine] : demonstrates good hygeine [Oriented x3] : oriented to person, place, and time [Normal Memory] : ~T memory was ~L unimpaired [Normal Mood/Affect] : mood and affect are normal [Soft, Nontender] : the abdomen was soft and nontender [Warm and Dry] : was warm and dry to touch [Normal Gait] : gait was normal [Vulvar Atrophy] : vulvar atrophy

## 2020-11-11 NOTE — PROCEDURE
[Straight Catheterization] : insertion of a straight catheter [Urgent Incontinence] : urgent incontinence [Other ___] : [unfilled] [Patient] : the patient [___ Fr Straight Tip] : a [unfilled] in [place] straight tip catheter [Cloudy] : cloudy [Culture] : culture [Urinalysis] : urinalysis [No Complications] : no complications [Tolerated Well] : the patient tolerated the procedure well [de-identified] : PVR 30mL [FreeTextEntry9] : PT unlikely able to provide clean catch urine due to vaginal atrophy.\par Urethra cleared, catheter passed.  No CVAT.

## 2020-11-11 NOTE — HISTORY OF PRESENT ILLNESS
[FreeTextEntry1] : Pt presents to the office with c/o urinary urgency and dysuria for the past few days.  \par Hx of vaginal atrophy and recurrent UTIs.  Pt haven't had a UTI for sometime.\par Had intercourse on Sunday and symptoms started to developed on Monday.  PT was took Azo, which helped minimal and symptoms persist.  She stopped the Azo yesterday as she was coming to the office to have urine checked.  She remains on the Estradiol tablets for vaginal atrophy and preventions of UTIs.\par Denies any fever, chills, back pain, or blood in urine.  PT is teary as the symptoms is uncomfortable and she has no control.  \par She is also on probiotics.

## 2020-11-11 NOTE — DISCUSSION/SUMMARY
[FreeTextEntry1] : Urine dip in office + Leuks, +proteins.  UA CS sent.\par Treating pt empirically with Cephalexin.  PT stated she is unable to take Sulfa and Nitrofurantoin did not helped in the past.  eRX Phenazopyridine sent as well.  Reinforced daily pericare.  Urinate after intercourse and wash up if necessary.  Drink fluids.  Continue Estradiol tablets as Rx.  \par If pt have any problems/concern to call office.  PT aware and agrees.

## 2020-11-16 LAB
APPEARANCE: ABNORMAL
BACTERIA: NEGATIVE
BILIRUBIN URINE: NEGATIVE
BLOOD URINE: ABNORMAL
COLOR: YELLOW
GLUCOSE QUALITATIVE U: NEGATIVE
HYALINE CASTS: 0 /LPF
KETONES URINE: NEGATIVE
LEUKOCYTE ESTERASE URINE: ABNORMAL
MICROSCOPIC-UA: NORMAL
NITRITE URINE: NEGATIVE
PH URINE: 6
PROTEIN URINE: NORMAL
RED BLOOD CELLS URINE: 8 /HPF
SPECIFIC GRAVITY URINE: 1.01
SQUAMOUS EPITHELIAL CELLS: 1 /HPF
UROBILINOGEN URINE: NORMAL
WHITE BLOOD CELLS URINE: 303 /HPF

## 2020-11-23 ENCOUNTER — APPOINTMENT (OUTPATIENT)
Dept: UROGYNECOLOGY | Facility: CLINIC | Age: 70
End: 2020-11-23
Payer: COMMERCIAL

## 2020-11-23 ENCOUNTER — NON-APPOINTMENT (OUTPATIENT)
Age: 70
End: 2020-11-23

## 2020-11-23 VITALS — SYSTOLIC BLOOD PRESSURE: 130 MMHG | HEART RATE: 83 BPM | TEMPERATURE: 98.6 F | DIASTOLIC BLOOD PRESSURE: 83 MMHG

## 2020-11-23 DIAGNOSIS — R10.2 PELVIC AND PERINEAL PAIN: ICD-10-CM

## 2020-11-23 LAB
BILIRUB UR QL STRIP: NORMAL
CLARITY UR: CLEAR
COLLECTION METHOD: NORMAL
GLUCOSE UR-MCNC: NORMAL
HCG UR QL: 0.2 EU/DL
HGB UR QL STRIP.AUTO: NORMAL
KETONES UR-MCNC: NORMAL
LEUKOCYTE ESTERASE UR QL STRIP: NORMAL
NITRITE UR QL STRIP: NORMAL
PH UR STRIP: 5.5
PROT UR STRIP-MCNC: NORMAL
SP GR UR STRIP: 1.03

## 2020-11-23 PROCEDURE — 51701 INSERT BLADDER CATHETER: CPT

## 2020-11-23 PROCEDURE — 99214 OFFICE O/P EST MOD 30 MIN: CPT | Mod: 25

## 2020-11-23 NOTE — PHYSICAL EXAM
[No Acute Distress] : in no acute distress [Well developed] : well developed [Well Nourished] : ~L well nourished [Good Hygeine] : demonstrates good hygeine [Oriented x3] : oriented to person, place, and time [Normal Memory] : ~T memory was ~L unimpaired [Normal Mood/Affect] : mood and affect are normal [Soft, Nontender] : the abdomen was soft and nontender [None] : no CVA tenderness [Warm and Dry] : was warm and dry to touch [Normal Gait] : gait was normal [Vulvar Atrophy] : vulvar atrophy [Normal] : was normal

## 2020-11-23 NOTE — PROCEDURE
[Straight Catheterization] : insertion of a straight catheter [Urinary Tract Infection] : a urinary tract infection [Patient] : the patient [___ Fr Straight Tip] : a [unfilled] in [place] straight tip catheter [None] : there were no complications with the catheter insertion [Clear] : clear [Culture] : culture [Urinalysis] : urinalysis [No Complications] : no complications [Tolerated Well] : the patient tolerated the procedure well [Post procedure instructions and information given] : Post procedure instructions and information were given and reviewed with patient. [0] : 0

## 2020-11-24 NOTE — HISTORY OF PRESENT ILLNESS
[FreeTextEntry1] : Pt was initially seen with c/o recurrent UTI.  2 positive cultures were sent from her GYN.  She was here on 11/11 for c/o UTI and CS >100k ecoli pan sensitive treated with keflex.  She reports feeling better after completing course of keflex on 11/18 but notes that urgency and pelvic pressure returned on 11/21.  Denies dysuria, blood in the urine, back pain, fever or chills.  She is using vagifem BIW.  Notes that UTI on 11/11 was post coital (intercourse on 11/9) but denies current symptoms being post-coital.  She is also not sure if her previous UTI's were post-coital.  She is drinking only about 24oz water/day.

## 2020-11-24 NOTE — DISCUSSION/SUMMARY
[FreeTextEntry1] : In office dip with moderate blood.  Discussed with patient that this could be due to recent UTI but will send UA CS and treat based on results.  Advised increased water intake to at least 40oz/day, avoidance of bladder irritations and pyridium PRN.  Instructed to call with any questions or concerns and she verbalizes understanding. \par \par \par Multiple questions answered re: recurrent UTI, urgency and vaginal atrophy and I spent 25 minutes with patient.

## 2020-11-25 LAB
APPEARANCE: CLEAR
BACTERIA UR CULT: NORMAL
BACTERIA: NEGATIVE
BILIRUBIN URINE: NEGATIVE
BLOOD URINE: ABNORMAL
COLOR: NORMAL
GLUCOSE QUALITATIVE U: NEGATIVE
HYALINE CASTS: 1 /LPF
KETONES URINE: NEGATIVE
LEUKOCYTE ESTERASE URINE: NEGATIVE
MICROSCOPIC-UA: NORMAL
NITRITE URINE: NEGATIVE
PH URINE: 6
PROTEIN URINE: NORMAL
RED BLOOD CELLS URINE: 1 /HPF
SPECIFIC GRAVITY URINE: 1.03
SQUAMOUS EPITHELIAL CELLS: 1 /HPF
UROBILINOGEN URINE: NORMAL
WHITE BLOOD CELLS URINE: 1 /HPF

## 2020-11-25 RX ORDER — CEPHALEXIN 500 MG/1
500 CAPSULE ORAL
Qty: 14 | Refills: 0 | Status: DISCONTINUED | COMMUNITY
Start: 2020-11-11

## 2020-12-07 ENCOUNTER — APPOINTMENT (OUTPATIENT)
Dept: UROGYNECOLOGY | Facility: CLINIC | Age: 70
End: 2020-12-07
Payer: COMMERCIAL

## 2020-12-07 VITALS
HEART RATE: 78 BPM | SYSTOLIC BLOOD PRESSURE: 172 MMHG | BODY MASS INDEX: 21.71 KG/M2 | HEIGHT: 62 IN | OXYGEN SATURATION: 96 % | DIASTOLIC BLOOD PRESSURE: 98 MMHG | WEIGHT: 118 LBS | TEMPERATURE: 97.3 F

## 2020-12-07 PROCEDURE — 99072 ADDL SUPL MATRL&STAF TM PHE: CPT

## 2020-12-07 PROCEDURE — 99214 OFFICE O/P EST MOD 30 MIN: CPT

## 2020-12-07 RX ORDER — CEPHALEXIN 500 MG/1
500 TABLET ORAL
Qty: 14 | Refills: 0 | Status: COMPLETED | COMMUNITY
Start: 2020-11-11 | End: 2020-12-07

## 2020-12-07 RX ORDER — PHENAZOPYRIDINE HYDROCHLORIDE 200 MG/1
200 TABLET ORAL 3 TIMES DAILY
Qty: 12 | Refills: 1 | Status: COMPLETED | COMMUNITY
Start: 2020-11-11 | End: 2020-12-07

## 2020-12-07 NOTE — DISCUSSION/SUMMARY
[FreeTextEntry1] : Discussed with patient that in regards to her recurrent UTI and vaginal atrophy. Patient had a UTI while being on the vagifem consistently (without missing dose, per pt) offered patient option that she can increase vagifem to three times weekly. Risks vs benefits discussed. She agreed to trying Vagifem three times weekly. Discussed with patient to continue use of A&D ointment daily as she has found it beneficial. If patient feels that she has another UTI, instructed to return to a [facility] clinic for evaluation of urine. Instructed to call with any questions or concerns and she verbalizes understanding. Follow up in 6 months or sooner if needed. All questions answered\par

## 2020-12-07 NOTE — HISTORY OF PRESENT ILLNESS
[Unable To Restrain Bowel Movement] : mild [Urinary Frequency] : no [Feelings Of Urinary Urgency] : mild [x1] : nocturia once nightly [Urinary Tract Infection] : no [Cystocele (Obstetric)] : no [Uterine Prolapse] : no [Vaginal Wall Prolapse] : no [Rectal Prolapse] : no [de-identified] : occasionally  [de-identified] : reports improved symptoms  [FreeTextEntry1] : Tiff is a 69 yo with  a history of recurrent UTI and vaginal atrophy. She was last treated on 11/11 for E.Coli UTI with Keflex despite being on Vagifem twice weekly regularly since 07/2020. She was prescribed Pyridium on 11/25 which improved her urinary frequency. Patient reports that her symptoms have improved today and does not feel that she has a UTI today. Denies dysuria, hematuria, back pain, fevers, or chills. She has been using A&D ointment, daily, which has also improved her symptoms of urinary frequency and urgency. \par \par PSH: LAVH in 2005\par \par 11/23 - Urine culture - no growth\par 11/11 - + E.Coli\par 7/7 - Urine culture - no growth

## 2020-12-07 NOTE — REASON FOR VISIT
[Follow-Up Visit_____] : a follow-up visit for [unfilled] [Urine Frequency] : urine frequency [Recurrent Urinary Infections] : recurrent urinary infections

## 2020-12-07 NOTE — PHYSICAL EXAM
[Chaperone Present] : A chaperone was present in the examining room during all aspects of the physical examination [No Acute Distress] : in no acute distress [Well developed] : well developed [Well Nourished] : ~L well nourished [Good Hygeine] : demonstrates good hygeine [Oriented x3] : oriented to person, place, and time [Normal Memory] : ~T memory was ~L unimpaired [Normal Mood/Affect] : mood and affect are normal [Soft, Nontender] : the abdomen was soft and nontender [None] : no CVA tenderness [Warm and Dry] : was warm and dry to touch [Normal Gait] : gait was normal [Vulvar Atrophy] : vulvar atrophy [Normal] : was normal [Labia Majora] : were normal [Labia Minora] : were normal [Normal Appearance] : general appearance was normal [Atrophy] : atrophy [Absent] : absent [Exam Deferred] : was deferred [de-identified] : No prolapse noted

## 2020-12-09 ENCOUNTER — APPOINTMENT (OUTPATIENT)
Dept: UROGYNECOLOGY | Facility: CLINIC | Age: 70
End: 2020-12-09
Payer: COMMERCIAL

## 2020-12-09 ENCOUNTER — NON-APPOINTMENT (OUTPATIENT)
Age: 70
End: 2020-12-09

## 2020-12-09 VITALS
HEIGHT: 62 IN | DIASTOLIC BLOOD PRESSURE: 72 MMHG | SYSTOLIC BLOOD PRESSURE: 126 MMHG | BODY MASS INDEX: 21.71 KG/M2 | WEIGHT: 118 LBS | TEMPERATURE: 97.3 F

## 2020-12-09 LAB
BILIRUB UR QL STRIP: NORMAL
CLARITY UR: NORMAL
COLLECTION METHOD: NORMAL
GLUCOSE UR-MCNC: NORMAL
HCG UR QL: 0.2 EU/DL
HGB UR QL STRIP.AUTO: NORMAL
KETONES UR-MCNC: NORMAL
LEUKOCYTE ESTERASE UR QL STRIP: NORMAL
NITRITE UR QL STRIP: NORMAL
PH UR STRIP: 5.5
PROT UR STRIP-MCNC: 100
SP GR UR STRIP: 1.03

## 2020-12-09 PROCEDURE — 99072 ADDL SUPL MATRL&STAF TM PHE: CPT

## 2020-12-09 PROCEDURE — 81003 URINALYSIS AUTO W/O SCOPE: CPT | Mod: QW

## 2020-12-09 PROCEDURE — 99213 OFFICE O/P EST LOW 20 MIN: CPT

## 2020-12-09 NOTE — PHYSICAL EXAM
[No Acute Distress] : in no acute distress [Well developed] : well developed [Well Nourished] : ~L well nourished [Good Hygeine] : demonstrates good hygeine [Oriented x3] : oriented to person, place, and time [Soft, Nontender] : the abdomen was soft and nontender [Normal Gait] : gait was normal [Labia Majora] : were normal [Labia Minora] : were normal [Normal] : was normal [Atrophy] : atrophy

## 2020-12-10 NOTE — HISTORY OF PRESENT ILLNESS
[FreeTextEntry1] : 71 y/o female with history of vaginal atrophy presents with complaints of urinary frequency, urinary urgency and pelvic pain/pressure since yesterday. Denies blood in her urine. States she has history of UTI and she felt like this was the start of another urinary infection. Patient states she was seen by Dr. Bruno on Monday 12/7/2020, who advised her to up estrogen cream to TIW.

## 2020-12-10 NOTE — DISCUSSION/SUMMARY
[FreeTextEntry1] : Urine dip completed, positive for leukocytes, formal UA/CS sent to lab. \par eRX sent for Nitrofurantoin\par patient educated to apply zinc oxide or other skin agents to external vagina for irritation.\par Patient encouraged to take OTC Azo for urinary discomfort. \par Patient to continue estrogen cream usage per Dr. Bruno orders. \par May call office with questions or concerns, patient agrees and verbalized understanding.

## 2020-12-10 NOTE — PROCEDURE
[Straight Catheterization] : insertion of a straight catheter [Urinary Frequency] : urinary frequency [Patient] : the patient [___ Fr Straight Tip] : a [unfilled] in [place] straight tip catheter [None] : there were no complications with the catheter insertion [Cloudy] : cloudy [Culture] : culture [Urinalysis] : urinalysis [No Complications] : no complications [Tolerated Well] : the patient tolerated the procedure well [de-identified] : PVR 40ml [FreeTextEntry1] : Urethra cleared and catheter inserted

## 2020-12-14 LAB
APPEARANCE: ABNORMAL
BACTERIA: NEGATIVE
BILIRUBIN URINE: NEGATIVE
BLOOD URINE: ABNORMAL
CALCIUM OXALATE CRYSTALS: ABNORMAL
COLOR: YELLOW
GLUCOSE QUALITATIVE U: NEGATIVE
HYALINE CASTS: 0 /LPF
KETONES URINE: NEGATIVE
LEUKOCYTE ESTERASE URINE: ABNORMAL
MICROSCOPIC-UA: NORMAL
NITRITE URINE: NEGATIVE
PH URINE: 6
PROTEIN URINE: ABNORMAL
RED BLOOD CELLS URINE: 110 /HPF
SPECIFIC GRAVITY URINE: >=1.03
SQUAMOUS EPITHELIAL CELLS: 1 /HPF
UROBILINOGEN URINE: NORMAL
WHITE BLOOD CELLS URINE: >720 /HPF

## 2020-12-17 ENCOUNTER — NON-APPOINTMENT (OUTPATIENT)
Age: 70
End: 2020-12-17

## 2020-12-31 ENCOUNTER — NON-APPOINTMENT (OUTPATIENT)
Age: 70
End: 2020-12-31

## 2021-01-05 ENCOUNTER — APPOINTMENT (OUTPATIENT)
Dept: UROGYNECOLOGY | Facility: CLINIC | Age: 71
End: 2021-01-05
Payer: COMMERCIAL

## 2021-01-05 LAB
BILIRUB UR QL STRIP: NORMAL
CLARITY UR: CLEAR
COLLECTION METHOD: NORMAL
GLUCOSE UR-MCNC: NORMAL
HCG UR QL: 0.2 EU/DL
HGB UR QL STRIP.AUTO: NORMAL
KETONES UR-MCNC: NORMAL
LEUKOCYTE ESTERASE UR QL STRIP: NORMAL
NITRITE UR QL STRIP: NORMAL
PH UR STRIP: 6
PROT UR STRIP-MCNC: NORMAL
SP GR UR STRIP: 1

## 2021-01-05 PROCEDURE — 81003 URINALYSIS AUTO W/O SCOPE: CPT | Mod: QW

## 2021-01-05 PROCEDURE — 99072 ADDL SUPL MATRL&STAF TM PHE: CPT

## 2021-01-05 PROCEDURE — 99213 OFFICE O/P EST LOW 20 MIN: CPT

## 2021-01-05 NOTE — HISTORY OF PRESENT ILLNESS
[FreeTextEntry1] : 69 y/o female with history of vaginal atrophy presents with complaints of urinary frequency, urinary urgency and pelvic pain/pressure since yesterday. Denies blood in her urine. States she has history of UTI and she felt like this was the start of another urinary infection. Patient last seen on 12/9/2020 and had positive UTI and treated with Nitrofurantoin.  She was given an emergency Rx of Nitrofurantoin in case she needed when she was away and she did take the course because she had symptoms.  She was advised to continue use of estrogen cream TIW. \par Denies any fever, chills, back pain, or blood in urine.\par Pt very anxious about her recurrent UTi and wants to get on abx prophylaxis.

## 2021-01-05 NOTE — END OF VISIT
[FreeTextEntry3] : pt seen, I have reviewed the above and agree with all pertinent clinical information including history and physical examination and agree with treatment plan.\par

## 2021-01-05 NOTE — PROCEDURE
[Straight Catheterization] : insertion of a straight catheter [Urinary Frequency] : urinary frequency [Patient] : the patient [___ Fr Straight Tip] : a [unfilled] in [place] straight tip catheter [None] : there were no complications with the catheter insertion [Cloudy] : cloudy [Culture] : culture [Urinalysis] : urinalysis [No Complications] : no complications [Tolerated Well] : the patient tolerated the procedure well [de-identified] : PVR 80ml [FreeTextEntry1] : Urine via catheter as pt is unable to provide clean catch due to vaginal atrophy.\par Urethra cleared, catheter inserted, No CVAT.

## 2021-01-05 NOTE — DISCUSSION/SUMMARY
[FreeTextEntry1] : Urine dip completed, positive for trace blood, formal UA/CS sent to lab. \par eRX sent for Bactrim DS and Phenazopyridine to hold as pt is going away every weekend to [place].\par Pt will have Renal US done.\par PT will schedule for cysto appt.\par Patient educated to apply zinc oxide or other skin agents to external vagina for irritation.\par Patient to continue estrogen cream usage per Dr. Bruno orders. \par Will hold off abx prophylaxis at this time pending UA CS results.\par May call office with questions or concerns, patient agrees and verbalized understanding.

## 2021-01-05 NOTE — PHYSICAL EXAM
[No Acute Distress] : in no acute distress [Well developed] : well developed [Well Nourished] : ~L well nourished [Good Hygeine] : demonstrates good hygeine [Oriented x3] : oriented to person, place, and time [Normal Memory] : ~T memory was ~L unimpaired [Anxiety] : patient is anxious [Soft, Nontender] : the abdomen was soft and nontender [Warm and Dry] : was warm and dry to touch [Normal Gait] : gait was normal [Vulvar Atrophy] : vulvar atrophy [Labia Majora] : were normal [Labia Minora] : were normal [Normal] : was normal [Atrophy] : atrophy

## 2021-01-06 ENCOUNTER — NON-APPOINTMENT (OUTPATIENT)
Age: 71
End: 2021-01-06

## 2021-01-07 ENCOUNTER — NON-APPOINTMENT (OUTPATIENT)
Age: 71
End: 2021-01-07

## 2021-01-12 ENCOUNTER — APPOINTMENT (OUTPATIENT)
Dept: ULTRASOUND IMAGING | Facility: IMAGING CENTER | Age: 71
End: 2021-01-12
Payer: COMMERCIAL

## 2021-01-12 ENCOUNTER — OUTPATIENT (OUTPATIENT)
Dept: OUTPATIENT SERVICES | Facility: HOSPITAL | Age: 71
LOS: 1 days | End: 2021-01-12
Payer: COMMERCIAL

## 2021-01-12 DIAGNOSIS — Z98.890 OTHER SPECIFIED POSTPROCEDURAL STATES: Chronic | ICD-10-CM

## 2021-01-12 DIAGNOSIS — N39.0 URINARY TRACT INFECTION, SITE NOT SPECIFIED: ICD-10-CM

## 2021-01-12 DIAGNOSIS — R39.15 URGENCY OF URINATION: ICD-10-CM

## 2021-01-12 DIAGNOSIS — R35.0 FREQUENCY OF MICTURITION: ICD-10-CM

## 2021-01-12 PROCEDURE — 76770 US EXAM ABDO BACK WALL COMP: CPT

## 2021-01-12 PROCEDURE — 76770 US EXAM ABDO BACK WALL COMP: CPT | Mod: 26

## 2021-01-13 LAB — BACTERIA UR CULT: NORMAL

## 2021-01-19 ENCOUNTER — APPOINTMENT (OUTPATIENT)
Dept: UROGYNECOLOGY | Facility: CLINIC | Age: 71
End: 2021-01-19

## 2021-02-01 ENCOUNTER — APPOINTMENT (OUTPATIENT)
Dept: UROLOGY | Facility: CLINIC | Age: 71
End: 2021-02-01

## 2021-02-02 ENCOUNTER — APPOINTMENT (OUTPATIENT)
Dept: UROGYNECOLOGY | Facility: CLINIC | Age: 71
End: 2021-02-02
Payer: COMMERCIAL

## 2021-02-02 ENCOUNTER — OUTPATIENT (OUTPATIENT)
Dept: OUTPATIENT SERVICES | Facility: HOSPITAL | Age: 71
LOS: 1 days | End: 2021-02-02
Payer: COMMERCIAL

## 2021-02-02 VITALS — TEMPERATURE: 96.7 F | DIASTOLIC BLOOD PRESSURE: 86 MMHG | SYSTOLIC BLOOD PRESSURE: 140 MMHG

## 2021-02-02 DIAGNOSIS — Z01.818 ENCOUNTER FOR OTHER PREPROCEDURAL EXAMINATION: ICD-10-CM

## 2021-02-02 DIAGNOSIS — Z87.898 PERSONAL HISTORY OF OTHER SPECIFIED CONDITIONS: ICD-10-CM

## 2021-02-02 DIAGNOSIS — Z98.890 OTHER SPECIFIED POSTPROCEDURAL STATES: Chronic | ICD-10-CM

## 2021-02-02 PROCEDURE — 52000 CYSTOURETHROSCOPY: CPT

## 2021-03-01 ENCOUNTER — APPOINTMENT (OUTPATIENT)
Dept: ORTHOPEDIC SURGERY | Facility: CLINIC | Age: 71
End: 2021-03-01
Payer: COMMERCIAL

## 2021-03-01 VITALS — HEIGHT: 62 IN | WEIGHT: 118 LBS | BODY MASS INDEX: 21.71 KG/M2

## 2021-03-01 DIAGNOSIS — S92.911A UNSPECIFIED FRACTURE OF RIGHT TOE(S), INITIAL ENCOUNTER FOR CLOSED FRACTURE: ICD-10-CM

## 2021-03-01 PROCEDURE — 73630 X-RAY EXAM OF FOOT: CPT | Mod: RT

## 2021-03-01 PROCEDURE — 99203 OFFICE O/P NEW LOW 30 MIN: CPT

## 2021-03-01 PROCEDURE — 99072 ADDL SUPL MATRL&STAF TM PHE: CPT

## 2021-03-01 NOTE — ADDENDUM
[FreeTextEntry1] : I, Loi Packer, acted solely as a scribe for Dr. Jose Miguel Parsons on this date 03/01/2021.\par All medical record entries made by the Scribe were at my, Dr. Jose Miguel Parsons, direction and personally dictated by me on 03/01/2021. I have reviewed the chart and agree that the record accurately reflects my personal performance of the history, physical exam, assessment and plan. I have also personally directed, reviewed, and agreed with the chart.

## 2021-03-01 NOTE — CONSULT LETTER
[Dear  ___] : Dear  [unfilled], [Consult Letter:] : I had the pleasure of evaluating your patient, [unfilled]. [Please see my note below.] : Please see my note below. [Consult Closing:] : Thank you very much for allowing me to participate in the care of this patient.  If you have any questions, please do not hesitate to contact me. [Sincerely,] : Sincerely, [FreeTextEntry3] : Jose Miguel Parsons M.D.

## 2021-03-01 NOTE — PHYSICAL EXAM
[de-identified] : Constitutional\par o Appearance : well-nourished, well developed, alert, in no acute distress \par Head and Face\par o Head :\par ¦ Inspection : atraumatic, normocephalic\par o Face:\par ¦ Inspection : no visible rash or discoloration\par Respiratory\par o Respiratory Effort: breathing unlabored \par Neurologic\par o Mental Status Examination :\par ¦ Orientation : Normal exam - Orientation to time, place and person; (A/O X3), Mood/affect normal\par Psychiatric\par o Mood and Affect: mood normal, affect appropriate \par Cardiovascular\par o Observation/Palpation : - no swelling,normal pulses, normal temperature \par Lymphatic\par o Additional Nodes : No palpable lymph nodes present \par \par Right Lower Extremity\par o Right Ankle : \par ¦ Inspection/Palpation : no tenderness to palpation, no swelling\par ¦ Range of Motion : arc of motion full and painless in all planes\par ¦ Stability : no joint instability on provocative testing\par ¦ Strength : all muscles 5/5\par o Skin : no erythema or ecchymosis present \par o Sensation : sensation to pin intact \par \par o Right Foot:\par ¦ Inspection/Palpation : tenderness over the proximal phalanx of the little toe, moderate swelling about the little toe\par ¦ Range of Motion : arc of motion full and painless in all planes\par ¦ Stability : no joint instability on provocative testing\par ¦ Strength : all muscles 5/5\par o Skin : no erythema or ecchymosis present\par o Reflexes : patellar tendon reflex 2+, ankle reflex 2+, Babinski sign absent (toes downgoing) Left Lower Extremity \par \par Left Lower Extremity \par o Left Ankle :\par ¦ Inspection/Palpation : no tenderness to palpation, no swelling\par ¦ Range of Motion : arc of motion full and painless in all planes\par ¦ Stability : no joint instability on provocative testing\par ¦ Strength : all muscles 5/5\par o Skin : no erythema or ecchymosis present\par o Sensation : sensation to pin intact \par \par o Left Foot:\par ¦ Inspection/Palpation : no tenderness to palpation, no swelling\par ¦ Range of Motion : arc of motion full and painless in all planes\par ¦ Stability : no joint instability on provocative testing\par ¦ Strength : all muscles 5/5\par o Skin: no erythema or ecchymosis present\par o Reflexes : patellar tendon reflex 2+, ankle reflex 2+, Babinski sign absent (toes downgoing) \par \par Gait and Station\par o Gait: poor pushoff on the right, no significant extremity swelling or lymphedema \par \par Radiology Results \par o Right Foot: AP, lateral and oblique views of the foot were obtained and reveal a healing fracture of the distal aspect of the proximal phalanx of the little toe in good position.

## 2021-03-01 NOTE — DISCUSSION/SUMMARY
[de-identified] : I discussed the underlying pathophysiology of the patient's condition in great detail with the patient. I went over the patient's x-rays with them in great detail. I informed her that she did fracture her little toe and that no surgical intervention is needed. We discussed the use of ice, Tylenol and anti-inflammatories to relieve pain. I advised the patient to wear a stiff-soled shoe with a wide toe box that better accommodates the feet. She can continue anne marie taping her toes. She needs to avoid high-impact activities such as running and jumping. I recommend alternate activities such as yoga, Pilates, barre classes, swimming, toning classes, and riding a stationary bike on low tension. She should focus on light weight and high repetition exercises. All of her questions were answered. She understands and consents to the plan.\par \par FU 1 month PRN.

## 2021-03-01 NOTE — HISTORY OF PRESENT ILLNESS
[de-identified] : 71 year old female presents complaining of right pinky toe pain and swelling following an injury about two weeks ago. She got out of bed and caught her right pinky toe on a chair. Her toe seemed to be displaced. She pushed it back into alignment. She iced and anne marie taped it, as well. She had some bruising, which has improved. She still has swelling and discomfort. She has pain when walking. She went skiing last week and had pain in her ski boot as they are tight. Of note, She note an allergy to sulfa drugs.

## 2021-05-20 ENCOUNTER — APPOINTMENT (OUTPATIENT)
Dept: ORTHOPEDIC SURGERY | Facility: CLINIC | Age: 71
End: 2021-05-20
Payer: COMMERCIAL

## 2021-05-20 DIAGNOSIS — M47.816 SPONDYLOSIS W/OUT MYELOPATHY OR RADICULOPATHY, LUMBAR REGION: ICD-10-CM

## 2021-05-20 DIAGNOSIS — M43.16 SPONDYLOLISTHESIS, LUMBAR REGION: ICD-10-CM

## 2021-05-20 DIAGNOSIS — M48.02 SPINAL STENOSIS, CERVICAL REGION: ICD-10-CM

## 2021-05-20 PROCEDURE — 99214 OFFICE O/P EST MOD 30 MIN: CPT

## 2021-05-20 PROCEDURE — 72050 X-RAY EXAM NECK SPINE 4/5VWS: CPT

## 2021-05-20 PROCEDURE — 72100 X-RAY EXAM L-S SPINE 2/3 VWS: CPT

## 2021-05-20 PROCEDURE — 72170 X-RAY EXAM OF PELVIS: CPT

## 2021-05-20 PROCEDURE — 99072 ADDL SUPL MATRL&STAF TM PHE: CPT

## 2021-05-20 NOTE — DISCUSSION/SUMMARY
[de-identified] : I discussed the underlying pathophysiology of the patient's condition in great detail with the patient. I went over the patient's x-rays with them in great detail. We discussed the use of ice, Tylenol and anti-inflammatories to relieve pain. At-home strengthening, and stretching exercises were discussed and demonstrated with the patient. She should focus on light weight and high repetition exercises. At this time, she will start a course of physical therapy for strengthening and flexibility. A prescription for physical therapy was provided. All of her questions were answered. She understands and consents to the plan. \par \par FU 1 month.\par after undergoing PT for her low back and neck.

## 2021-05-20 NOTE — ADDENDUM
[FreeTextEntry1] : I, Loi Packer, acted solely as a scribe for Dr. Jose Miguel Parsons on this date 05/20/2021.\par All medical record entries made by the Scribe were at my, Dr. Jose Miguel Parsons, direction and personally dictated by me on 05/20/2021. I have reviewed the chart and agree that the record accurately reflects my personal performance of the history, physical exam, assessment and plan. I have also personally directed, reviewed, and agreed with the chart.

## 2021-05-20 NOTE — PHYSICAL EXAM
[de-identified] : Constitutional\par o Appearance : well-nourished, well developed, alert, in no acute distress \par Head and Face\par o Head :\par ¦ Inspection : atraumatic, normocephalic\par o Face :\par ¦ Inspection : no visible rash or discoloration\par Respiratory\par o Respiratory Effort: breathing unlabored \par Neurologic\par o Mental Status Examination :\par ¦ Orientation : alert and oriented X 3\par Psychiatric\par o Mood and Affect: mood normal, affect appropriate\par Cardiovascular\par o Observation/Palpation : - no swelling\par Lymphatic\par o Additional Nodes : No palpable lymph nodes present\par \par Cervical Spine\par o Inspection/Palpation :\par ¦ Inspection : alignment midline, normal degree of lordosis present\par ¦ Skin : normal appearance, no masses or tenderness, trachea midline\par ¦ Palpation : right worse than left paracervical tenderness, no parascapular tenderness\par o Range of Motion : flexion and extension are limited and cause discomfort, limited rotation\par o Tests: Negative Spurling’s test \par \par Right Upper Extremity \par o Right Shoulder :\par ¦ Inspection/Palpation : no tenderness, no swelling or deformities\par ¦ Range of Motion : full and painless in all planes, no crepitance\par ¦ Strength : forward elevation, internal rotation 5/5, external rotation 5/5, external rotation at 90° of abduction, supraspinatus, adduction and abduction, biceps/triceps, 5/5\par ¦ Stability : no joint instability on provocative testing \par ¦ Tests: Anderson negative, Neer negative, Charlee negative, negative drop arm test \par \par Left Upper Extremity \par o Left Shoulder :\par ¦ Inspection/Palpation : no tenderness, no swelling or deformities\par ¦ Range of Motion : full and painless in all planes, no crepitance\par ¦ Strength :  forward elevation, internal rotation 5/5, external rotation 5/5, external rotation at 90° of abduction, supraspinatus, adduction and abduction, biceps/triceps, 5/5\par ¦ Stability : no joint instability on provocative testing\par ¦ Tests: Anderson negative, Neer negative, Charlee negative, negative drop arm test \par \par Lumbosacral Spine\par o Inspection : no visible rash or discoloration, well-healed small incision\par o Palpation : no paraspinal musculature tenderness\par o Range of Motion : extension and sidebending cause discomfort, rotation to the right causes discomfort\par o Muscle Strength : paraspinal muscle strength and tone within normal limits\par o Muscle Tone : paraspinal muscle strength and tone within normal limits\par o Tests: straight leg test negative and HAY test negative bilaterally\par  \par Right Lower Extremity\par o Buttock : no tenderness, swelling or deformities\par o Right Hip :\par ¦ Inspection/Palpation : no tenderness, no swelling or deformities\par ¦ Range of Motion : full and painless in all planes, no crepitance\par ¦ Stability : joint stability intact\par ¦ Strength : extension, flexion, adduction, abduction, internal rotation and external rotation, 4-/5\par ¦ Tests: Hans’s test negative\par \par Left Lower Extremity\par o Buttock : no tenderness, swelling or deformities \par o Left Hip :\par ¦ Inspection/Palpation : no tenderness, no swelling or deformities\par ¦ Range of Motion : full and painless in all planes, no crepitance\par ¦ Stability : joint stability intact\par ¦ Strength : extension, flexion, adduction, abduction, internal rotation and external rotation, 4-/5\par ¦ Tests: Hans’s test negative\par \par Gait: gait normal, no significant extremity swelling or lymphedema, good proprioception and balance, limited core strength\par \par Radiology Results\par o Cervical Spine : AP, lateral and obliques views were obtained and revealed degenerative disc disease at C3/C4, C4/C5 and C5/C6, foraminal stenosis at C5/C6 and C6/C7 \par o Lumbosacral Spine : AP and lateral views were obtained and revealed a mild grade 1 spondylolisthesis of L4 on L5 with diffuse facet arthropathy, no obvious compression fracture.\par o Hip and Pelvis: AP pelvis was obtained and revealed mild to moderate arthritis of both hips and sacroiliac joints, no lytic lesions that are visible.

## 2021-05-20 NOTE — HISTORY OF PRESENT ILLNESS
[de-identified] : 71 year old female presents complaining of right lower lumbar discomfort without radiation of pain down her legs. She also notes anterior right hip pain when she walks. She notes weakness in her legs and core. She had a back surgery ~25 years ago at L4/5. She also complains of chronic neck pain. She notes constant right paracervical discomfort. She saw a Neurologist in the past who diagnosed her with cervical spine stenosis. She does not experience any numbness or tingling down her arms or legs.  She claims she is very weak can hardly walk a quarter of a mile.  She has even sitting up from a lying position is difficult for her.

## 2021-07-07 ENCOUNTER — APPOINTMENT (OUTPATIENT)
Dept: UROGYNECOLOGY | Facility: CLINIC | Age: 71
End: 2021-07-07
Payer: COMMERCIAL

## 2021-07-07 VITALS — DIASTOLIC BLOOD PRESSURE: 80 MMHG | TEMPERATURE: 96.8 F | SYSTOLIC BLOOD PRESSURE: 140 MMHG

## 2021-07-07 PROCEDURE — 99213 OFFICE O/P EST LOW 20 MIN: CPT

## 2021-07-07 PROCEDURE — 99072 ADDL SUPL MATRL&STAF TM PHE: CPT

## 2021-07-07 NOTE — PHYSICAL EXAM
[No Acute Distress] : in no acute distress [Well developed] : well developed [Well Nourished] : ~L well nourished [Oriented x3] : oriented to person, place, and time [Respirations regular] : ~T respiratory rate was regular [Warm and Dry] : was warm and dry to touch [Normal Gait] : gait was normal [Chaperone Present] : A chaperone was present in the examining room during all aspects of the physical examination [Labia Majora] : were normal [Labia Minora] : were normal [Normal Appearance] : general appearance was normal [Normal] : no abnormalities [Atrophy] : atrophy

## 2021-07-07 NOTE — DISCUSSION/SUMMARY
[FreeTextEntry1] : Patient reports overall improvement in urgency and frequency symptoms and denies any recent UTI. Discussed continued use of vaginal estrogen cream three times per week, as she did not have relief of symptoms with twice per week dosing.  Risks and benefits of vaginal estrogen were discussed will continue Pyridium PRN as needed as well. Will return to office in 1 year or PRN.  All questions were answered

## 2021-07-07 NOTE — HISTORY OF PRESENT ILLNESS
[FreeTextEntry1] : \par 70yo w/ history of vaginal atrophy, urinary frequency/urgency, pelvic pressure and recurrent UTI presenting for follow up appointment. She was last seen 1/5/21 for above symptoms and was given pyridium RX and recommended to continue vaginal estrogen. UA/Urine culture at that time was negative for infection and was found to have 1 RBC/HPF. Since that time, she reports she has been doing well. Denies any recent UTI, dysuria/frequency. Reports feeling urgency about once per week that is relieved with Pyridium PRN. She is using vaginal estrogen three times per week at this time. \par \par \par Also was noted to have trace blood on Urine dip and was recommended to have Renal US and cystoscopy performed. \par Renal US (1/12/21): Normal Renal US\par Cystoscopy (2/2/21): Normal urethra, bladder outlet, ureteral orifices. Clear efflux of urine bilaterally. \par

## 2021-07-14 ENCOUNTER — APPOINTMENT (OUTPATIENT)
Dept: MAMMOGRAPHY | Facility: IMAGING CENTER | Age: 71
End: 2021-07-14
Payer: COMMERCIAL

## 2021-07-14 ENCOUNTER — APPOINTMENT (OUTPATIENT)
Dept: ULTRASOUND IMAGING | Facility: IMAGING CENTER | Age: 71
End: 2021-07-14
Payer: COMMERCIAL

## 2021-07-14 ENCOUNTER — OUTPATIENT (OUTPATIENT)
Dept: OUTPATIENT SERVICES | Facility: HOSPITAL | Age: 71
LOS: 1 days | End: 2021-07-14
Payer: COMMERCIAL

## 2021-07-14 DIAGNOSIS — Z00.8 ENCOUNTER FOR OTHER GENERAL EXAMINATION: ICD-10-CM

## 2021-07-14 DIAGNOSIS — Z98.890 OTHER SPECIFIED POSTPROCEDURAL STATES: Chronic | ICD-10-CM

## 2021-07-14 PROCEDURE — 77063 BREAST TOMOSYNTHESIS BI: CPT

## 2021-07-14 PROCEDURE — 77067 SCR MAMMO BI INCL CAD: CPT | Mod: 26

## 2021-07-14 PROCEDURE — 77063 BREAST TOMOSYNTHESIS BI: CPT | Mod: 26

## 2021-07-14 PROCEDURE — 76641 ULTRASOUND BREAST COMPLETE: CPT | Mod: 26,50

## 2021-07-14 PROCEDURE — 76641 ULTRASOUND BREAST COMPLETE: CPT

## 2021-07-14 PROCEDURE — 77067 SCR MAMMO BI INCL CAD: CPT

## 2021-09-01 LAB
APPEARANCE: CLEAR
BACTERIA: NEGATIVE
BILIRUBIN URINE: NEGATIVE
BLOOD URINE: NEGATIVE
COLOR: COLORLESS
GLUCOSE QUALITATIVE U: NEGATIVE
HYALINE CASTS: 0 /LPF
KETONES URINE: NEGATIVE
LEUKOCYTE ESTERASE URINE: NEGATIVE
MICROSCOPIC-UA: NORMAL
NITRITE URINE: NEGATIVE
PH URINE: 6
PROTEIN URINE: NEGATIVE
RED BLOOD CELLS URINE: 1 /HPF
SPECIFIC GRAVITY URINE: 1
SQUAMOUS EPITHELIAL CELLS: 0 /HPF
UROBILINOGEN URINE: NORMAL
WHITE BLOOD CELLS URINE: 0 /HPF

## 2022-03-30 ENCOUNTER — TRANSCRIPTION ENCOUNTER (OUTPATIENT)
Age: 72
End: 2022-03-30

## 2022-05-09 ENCOUNTER — RX RENEWAL (OUTPATIENT)
Age: 72
End: 2022-05-09

## 2022-06-23 ENCOUNTER — NON-APPOINTMENT (OUTPATIENT)
Age: 72
End: 2022-06-23

## 2022-06-23 ENCOUNTER — APPOINTMENT (OUTPATIENT)
Dept: INTERNAL MEDICINE | Facility: CLINIC | Age: 72
End: 2022-06-23
Payer: COMMERCIAL

## 2022-06-23 VITALS
WEIGHT: 122 LBS | TEMPERATURE: 98 F | SYSTOLIC BLOOD PRESSURE: 171 MMHG | HEIGHT: 62 IN | HEART RATE: 79 BPM | DIASTOLIC BLOOD PRESSURE: 84 MMHG | OXYGEN SATURATION: 95 % | BODY MASS INDEX: 22.45 KG/M2

## 2022-06-23 VITALS — DIASTOLIC BLOOD PRESSURE: 88 MMHG | SYSTOLIC BLOOD PRESSURE: 140 MMHG

## 2022-06-23 DIAGNOSIS — H91.90 UNSPECIFIED HEARING LOSS, UNSPECIFIED EAR: ICD-10-CM

## 2022-06-23 PROCEDURE — G0439: CPT

## 2022-06-23 PROCEDURE — 36415 COLL VENOUS BLD VENIPUNCTURE: CPT

## 2022-06-23 PROCEDURE — 93000 ELECTROCARDIOGRAM COMPLETE: CPT | Mod: 59

## 2022-06-23 PROCEDURE — G0444 DEPRESSION SCREEN ANNUAL: CPT | Mod: 59

## 2022-06-23 RX ORDER — POLYMYXIN B SULFATE AND TRIMETHOPRIM 10000; 1 [USP'U]/ML; MG/ML
10000-0.1 SOLUTION OPHTHALMIC 4 TIMES DAILY
Qty: 1 | Refills: 0 | Status: DISCONTINUED | COMMUNITY
Start: 2018-10-16 | End: 2022-06-23

## 2022-06-23 RX ORDER — MELOXICAM 15 MG/1
15 TABLET ORAL
Qty: 30 | Refills: 5 | Status: DISCONTINUED | COMMUNITY
Start: 2017-04-17 | End: 2022-06-23

## 2022-06-23 RX ORDER — SULFAMETHOXAZOLE AND TRIMETHOPRIM 800; 160 MG/1; MG/1
800-160 TABLET ORAL
Qty: 6 | Refills: 0 | Status: DISCONTINUED | COMMUNITY
Start: 2021-01-05 | End: 2022-06-23

## 2022-06-23 RX ORDER — NEOMYCIN AND POLYMYXIN B SULFATES AND DEXAMETHASONE 3.5; 10000; 1 MG/G; [IU]/G; MG/G
3.5-10000-0.1 OINTMENT OPHTHALMIC
Qty: 1 | Refills: 5 | Status: DISCONTINUED | COMMUNITY
Start: 2017-09-14 | End: 2022-06-23

## 2022-06-23 RX ORDER — NITROFURANTOIN (MONOHYDRATE/MACROCRYSTALS) 25; 75 MG/1; MG/1
100 CAPSULE ORAL
Qty: 10 | Refills: 0 | Status: DISCONTINUED | COMMUNITY
Start: 2020-12-09 | End: 2022-06-23

## 2022-06-23 RX ORDER — PHENAZOPYRIDINE 200 MG/1
200 TABLET, FILM COATED ORAL 3 TIMES DAILY
Qty: 12 | Refills: 0 | Status: DISCONTINUED | COMMUNITY
Start: 2020-12-14 | End: 2022-06-23

## 2022-06-24 LAB
25(OH)D3 SERPL-MCNC: 24.9 NG/ML
ALBUMIN SERPL ELPH-MCNC: 5 G/DL
ALP BLD-CCNC: 91 U/L
ALT SERPL-CCNC: 20 U/L
ANION GAP SERPL CALC-SCNC: 16 MMOL/L
APPEARANCE: CLEAR
AST SERPL-CCNC: 18 U/L
BACTERIA: NEGATIVE
BASOPHILS # BLD AUTO: 0.05 K/UL
BASOPHILS NFR BLD AUTO: 0.9 %
BILIRUB SERPL-MCNC: 0.4 MG/DL
BILIRUBIN URINE: NEGATIVE
BLOOD URINE: NEGATIVE
BUN SERPL-MCNC: 24 MG/DL
CALCIUM SERPL-MCNC: 9.6 MG/DL
CHLORIDE SERPL-SCNC: 105 MMOL/L
CHOLEST SERPL-MCNC: 294 MG/DL
CO2 SERPL-SCNC: 21 MMOL/L
COLOR: YELLOW
CREAT SERPL-MCNC: 0.86 MG/DL
EGFR: 72 ML/MIN/1.73M2
EOSINOPHIL # BLD AUTO: 0.11 K/UL
EOSINOPHIL NFR BLD AUTO: 1.9 %
ESTIMATED AVERAGE GLUCOSE: 111 MG/DL
GLUCOSE QUALITATIVE U: NEGATIVE
GLUCOSE SERPL-MCNC: 111 MG/DL
HBA1C MFR BLD HPLC: 5.5 %
HCT VFR BLD CALC: 41.8 %
HDLC SERPL-MCNC: 85 MG/DL
HGB BLD-MCNC: 13.6 G/DL
HYALINE CASTS: 1 /LPF
IMM GRANULOCYTES NFR BLD AUTO: 0.4 %
KETONES URINE: NEGATIVE
LDLC SERPL CALC-MCNC: 193 MG/DL
LEUKOCYTE ESTERASE URINE: NEGATIVE
LYMPHOCYTES # BLD AUTO: 1.95 K/UL
LYMPHOCYTES NFR BLD AUTO: 34.5 %
MAN DIFF?: NORMAL
MCHC RBC-ENTMCNC: 31.1 PG
MCHC RBC-ENTMCNC: 32.5 GM/DL
MCV RBC AUTO: 95.4 FL
MICROSCOPIC-UA: NORMAL
MONOCYTES # BLD AUTO: 0.66 K/UL
MONOCYTES NFR BLD AUTO: 11.7 %
NEUTROPHILS # BLD AUTO: 2.87 K/UL
NEUTROPHILS NFR BLD AUTO: 50.6 %
NITRITE URINE: NEGATIVE
NONHDLC SERPL-MCNC: 209 MG/DL
PH URINE: 6.5
PLATELET # BLD AUTO: 232 K/UL
POTASSIUM SERPL-SCNC: 4.6 MMOL/L
PROT SERPL-MCNC: 7.1 G/DL
PROTEIN URINE: NEGATIVE
RBC # BLD: 4.38 M/UL
RBC # FLD: 12.3 %
RED BLOOD CELLS URINE: 4 /HPF
SODIUM SERPL-SCNC: 142 MMOL/L
SPECIFIC GRAVITY URINE: 1.02
SQUAMOUS EPITHELIAL CELLS: 3 /HPF
TRIGL SERPL-MCNC: 78 MG/DL
TSH SERPL-ACNC: 2.13 UIU/ML
UROBILINOGEN URINE: NORMAL
WBC # FLD AUTO: 5.66 K/UL
WHITE BLOOD CELLS URINE: 2 /HPF

## 2022-06-30 NOTE — REASON FOR VISIT
Toshia Moore is a 14 year old female who presents for their annual WCC. Toshia is accompanied by mother .    Interim History:  H/o recurrent mouth sores- Currently stable with no current episodes.     Concerns:  Toshia reports migraine episodes with an episode 2 weeks ago with 3 - 4 overall this year. She states that she experiences dizziness with some blurred vision and decreased concentration and increased nausea later in the day. She states that the pain is in the front of the head. Mom treats with Tylenol with improvement. Toshia states that lays in a dark room during an episode. She states bad sleeping habits lately.     DIET  Fruits: multiple servings / day   Vegetables: some servings / day   Proteins: good variety   Dairy: cheese and yogurt regularly   Water servings: throughout the day   Juice or Soda: none   Caffeinated Beverages: Denies    Dietary supplements: no    Dental Hygiene:   - Brushes teeth two times per day  - Sees dentist every 6 months: yes    Elimination:   - Problems with diarrhea: no  - Problems with constipation: no    Sleep:  - Sleeps from: through the night   - Sleep issues/concerns: no    Academics:  - Grade in school: 8th. Enjoyed 7th grade. Had a lot of field trips. Good grades   - How is your child doing in school: good    Extracurricular Activities:  - Activities child is involved in: bike riding   - How many hours of screen time per day: 4 hours / day     LMP: Few days ago   Age of Menarche: 11   Regular Cycle: Yes   States that she has cramps during her cycle    RISK ASSESSMENT (HEADSSS):   Home  Lives with: Parents and brother   [x]  YES    []  NO    []  Unknown    Eats meals with family?  [x]  YES    []  NO    []  Unknown    Has family member/adult to turn to for  help?    Activities  [x]  YES    []  NO    []  Unknown    Has friends?  [x]  YES    []  NO    []  Unknown    Has at least 1 hour of physical activity per day?        - Go to [place] or hang out at someone's  house    Drugs  []  YES    [x]  NO    []  Confidential    Uses e-cigarettes, tobacco, alcohol or drugs?    Safety  [x]  YES    []  NO    []  Unknown    Home is free of violence?  [x]  YES    []  NO    []  Unknown    Uses safety belts/safety equipment?    Sex  []  YES    [x]  NO    []  Confidential   Dating anyone?   []  YES    [x]  NO    []  Confidential   Has had sex?  Suicide/Mental Health  [x]  YES    []  NO    []  Unknown   Has ways to cope with stress? More happy than sad days.     YES    [x]  NO    []  Unknown    Has concerns about body/appearance?  []  YES    [x]  NO    []  Unknown   Gets depressed, anxious or irritable /has mood swings?  []  YES    [x]  NO    []  Unknown    Has thoughts about hurting self or considering suicide?    Review of Systems   Constitutional: Negative.    HENT: Negative.    Respiratory: Negative.    Cardiovascular: Negative.    Gastrointestinal: Negative.    Genitourinary: Negative.    Musculoskeletal: Negative.    Skin: Negative.    Allergic/Immunologic: Negative.    Neurological: Positive for headaches (3 - 4 migraine this year ).   Psychiatric/Behavioral: Negative.        Vitals:    06/30/22 1822   BP: 111/60   Pulse: 75   Temp: 98.7 °F (37.1 °C)   TempSrc: Temporal   Weight: 60.6 kg (133 lb 9.6 oz)   Height: 5' 3.62\" (1.616 m)     88 %ile (Z= 1.17) based on CDC (Girls, 2-20 Years) BMI-for-age based on BMI available as of 6/30/2022.  Blood pressure reading is in the normal blood pressure range based on the 2017 AAP Clinical Practice Guideline.    Physical Exam  Vitals reviewed.   Constitutional:       General: She is not in acute distress.     Appearance: Normal appearance. She is well-developed and normal weight. She is not toxic-appearing.   HENT:      Head: Normocephalic and atraumatic.      Right Ear: Tympanic membrane, ear canal and external ear normal.      Left Ear: Tympanic membrane, ear canal and external ear normal.      Nose: Nose normal.      Mouth/Throat:      Mouth:  Mucous membranes are moist.   Eyes:      Conjunctiva/sclera: Conjunctivae normal.      Pupils: Pupils are equal, round, and reactive to light.   Neck:      Thyroid: No thyromegaly.      Comments: No Thyomegaly   Cardiovascular:      Rate and Rhythm: Normal rate and regular rhythm.      Pulses: Normal pulses.      Heart sounds: Normal heart sounds. No murmur heard.  Pulmonary:      Effort: Pulmonary effort is normal.      Breath sounds: Normal breath sounds.   Chest:   Breasts:      Andrade Score is 4.       Abdominal:      General: Abdomen is flat. Bowel sounds are normal. There is no distension.      Palpations: Abdomen is soft. There is no mass.      Tenderness: There is no abdominal tenderness. There is no guarding or rebound.   Genitourinary:     Andrade stage (genital): 4.   Musculoskeletal:         General: Normal range of motion.      Thoracic back: No scoliosis.      Lumbar back: No scoliosis.      Right lower leg: No edema.      Left lower leg: No edema.   Lymphadenopathy:      Cervical: No cervical adenopathy.   Skin:     General: Skin is warm and dry.      Findings: No lesion.   Neurological:      General: No focal deficit present.      Mental Status: She is alert and oriented to person, place, and time.      Cranial Nerves: No cranial nerve deficit.      Coordination: Coordination normal.   Psychiatric:         Mood and Affect: Mood normal.         Behavior: Behavior normal.         Thought Content: Thought content normal.         Judgment: Judgment normal.         Assessment   Problem List Items Addressed This Visit        Neuro    Migraine without aura and without status migrainosus, not intractable      Other Visit Diagnoses     Encounter for routine child health examination without abnormal findings    -  Primary          Toshia Moore is a 14 year old female  with PMH of BMI >94%tile presents for a WCC.     Migraines  Discussed preventative strategies such as sleeping regularly on a schedule, getting  8-9 hours of sleep a night, drinking plenty of water, avoiding caffeine and managing stress.   For symptoms management, recommend acetaminophen or ibuprofen PRN.   If having headaches more than twice a week consistently then will refer to neurology.   If having headaches with an aura, occipital headaches, or headaches that worsen or wake up from sleep, then return to clinic.     Recommended the COVID vaccine. Parents declined at this time in clinic.   Recommended to 5 servings of fruits and vegetables per day.   Start a MVI for teens      On 6/30/2022, [employer] scribed the services personally performed by Mikki Olivares DO.     I have reviewed and revised the note above. The documentation recorded by the scribe accurately reflects history obtained, actions preformed and decisions made by me.      Mikki Olivares DO         [Follow-Up Visit_____] : a follow-up visit for [unfilled]

## 2022-07-12 ENCOUNTER — APPOINTMENT (OUTPATIENT)
Dept: CARDIOLOGY | Facility: CLINIC | Age: 72
End: 2022-07-12

## 2022-07-12 ENCOUNTER — LABORATORY RESULT (OUTPATIENT)
Age: 72
End: 2022-07-12

## 2022-07-12 VITALS
HEART RATE: 81 BPM | OXYGEN SATURATION: 99 % | TEMPERATURE: 98.1 F | RESPIRATION RATE: 16 BRPM | HEIGHT: 62 IN | DIASTOLIC BLOOD PRESSURE: 94 MMHG | BODY MASS INDEX: 22.26 KG/M2 | WEIGHT: 121 LBS | SYSTOLIC BLOOD PRESSURE: 140 MMHG

## 2022-07-12 PROCEDURE — 99203 OFFICE O/P NEW LOW 30 MIN: CPT

## 2022-07-12 RX ORDER — ESTRADIOL 10 UG/1
10 TABLET, FILM COATED VAGINAL
Qty: 30 | Refills: 3 | Status: DISCONTINUED | COMMUNITY
Start: 2020-07-07 | End: 2022-07-12

## 2022-07-12 RX ORDER — HYDROCORTISONE ACETATE AND PRAMOXINE HYDROCHLORIDE 25; 10 MG/G; MG/G
1-2.5 OINTMENT TOPICAL
Qty: 28.4 | Refills: 3 | Status: DISCONTINUED | COMMUNITY
Start: 2017-01-11 | End: 2022-07-12

## 2022-07-12 NOTE — DISCUSSION/SUMMARY
[FreeTextEntry1] : This is a 72-year-old female with past medical history significant for hypercholesterolemia, status post back surgery (L4-L5), status post hysterectomy, who comes in for lipid/cardiac consultation.  She denies chest pain, shortness of breath, dizziness or syncope.  She may get occasional dyspnea on exertion.  She has no history of rheumatic fever.  She does not drink excessive caffeine or alcohol.  Her cardiac risk factors include hypercholesterolemia and family history of hypercholesterolemia (her mother and brother both have high cholesterol).\par I recommend the patient schedule exercise stress test to rule out significant coronary artery disease.  She will schedule echo Doppler examination to evaluate her left ventricular function, chamber size, murmur, rule out mitral valve prolapse, and rule out hypertrophy.\par Blood work done June 23, 2022 demonstrated total cholesterol 294 mg/dL,  mg/dL, HDL of 85 mg/dL and total cholesterol 78 mg/dL.\par Blood work done July 29, 2020 demonstrated total cholesterol 269 mg/dL, HDL of 77 mg/dL, LDL calculated 179 mg/dL, and triglycerides of 70 mg/dL.\par Her blood pressure is elevated today but she reports that she is always nervous in the doctor's office and manages her blood pressure at home which is within normal limits.\par I have told her that we will reevaluate her blood pressure at the time of her exercise stress test.\par Given this patient's risk profile, I feel that she would benefit from lipid-lowering therapy.  She will have new blood work done today including lipoprotein B, lipoprotein a, direct LDL cholesterol, lipid panel and SMA-20.  Given the possibility of heterozygous familial hypercholesterolemia, I would recommend an LDL target of less than 100 mg/dL.\par The patient's blood pressure is elevated today as well.  I have recommended she work with our registered dietitian to improve her lipid profile, and her salt intake.\par She is encouraged to continue on a regular aerobic activity.\par She is instructed follow-up with her primary care physician.  She will follow-up with me after the above-noted diagnostic tests are completed.\par The patient understands that aerobic exercises must be increased to 40 minutes 4 times per week. A detailed discussion of lifestyle modification was done today. The patient has a good understanding of the diagnosis, and treatment plan. Lifestyle modification was also outlined.\par \par Thank you for allowing participate in care of your patient.  Please do not hesitate to call if you have any further questions.

## 2022-07-12 NOTE — PHYSICAL EXAM
[Well Developed] : well developed [Well Nourished] : well nourished [No Acute Distress] : no acute distress [Normal Conjunctiva] : normal conjunctiva [Normal Venous Pressure] : normal venous pressure [No Carotid Bruit] : no carotid bruit [Normal S1, S2] : normal S1, S2 [No Rub] : no rub [5th Left ICS - MCL] : palpated at the 5th LICS in the midclavicular line [Normal] : normal [No Precordial Heave] : no precordial heave was noted [Normal Rate] : normal [Rhythm Regular] : regular [Normal S1] : normal S1 [Normal S2] : normal S2 [No Gallop] : no gallop heard [No Pitting Edema] : no pitting edema present [2+] : left 2+ [No Abnormalities] : the abdominal aorta was not enlarged and no bruit was heard [Clear Lung Fields] : clear lung fields [Good Air Entry] : good air entry [No Respiratory Distress] : no respiratory distress  [Soft] : abdomen soft [Non Tender] : non-tender [No Masses/organomegaly] : no masses/organomegaly [Normal Bowel Sounds] : normal bowel sounds [Normal Gait] : normal gait [No Edema] : no edema [No Cyanosis] : no cyanosis [No Clubbing] : no clubbing [No Varicosities] : no varicosities [No Rash] : no rash [No Skin Lesions] : no skin lesions [Moves all extremities] : moves all extremities [No Focal Deficits] : no focal deficits [Normal Speech] : normal speech [Alert and Oriented] : alert and oriented [Normal memory] : normal memory [II] : a grade 2 [Click] : a ~M click was heard [I] : a grade 1 [S3] : no S3 [S4] : no S4 [Right Carotid Bruit] : no bruit heard over the right carotid [Left Carotid Bruit] : no bruit heard over the left carotid [Right Femoral Bruit] : no bruit heard over the right femoral artery [Left Femoral Bruit] : no bruit heard over the left femoral artery

## 2022-07-12 NOTE — REASON FOR VISIT
[CV Risk Factors and Non-Cardiac Disease] : CV risk factors and non-cardiac disease [Hyperlipidemia] : hyperlipidemia [FreeTextEntry1] : Pt is a 71 y/o female past medical history significant for HLD, s/p L4/L5 back surgery, s/p hysterectomy. Pt is feeling well and has come in today for a lipid consultation. Pt denies SOB, chest pain, dizziness, or syncope. \par \par CRF include HLD, and family history of mother and brother with high cholesterol.\par Pt denies smoking and drinking.\par \par Labs from 6/23/22 show TG 78, total cholesterol 294, HDL 85, . \par Pt was recommended to work with a dietician.

## 2022-07-13 ENCOUNTER — NON-APPOINTMENT (OUTPATIENT)
Age: 72
End: 2022-07-13

## 2022-07-13 ENCOUNTER — APPOINTMENT (OUTPATIENT)
Dept: OPHTHALMOLOGY | Facility: CLINIC | Age: 72
End: 2022-07-13

## 2022-07-13 PROCEDURE — 92250 FUNDUS PHOTOGRAPHY W/I&R: CPT

## 2022-07-13 PROCEDURE — 76514 ECHO EXAM OF EYE THICKNESS: CPT

## 2022-07-13 PROCEDURE — 92015 DETERMINE REFRACTIVE STATE: CPT

## 2022-07-13 PROCEDURE — 92004 COMPRE OPH EXAM NEW PT 1/>: CPT

## 2022-07-20 ENCOUNTER — APPOINTMENT (OUTPATIENT)
Dept: ULTRASOUND IMAGING | Facility: IMAGING CENTER | Age: 72
End: 2022-07-20

## 2022-07-20 ENCOUNTER — APPOINTMENT (OUTPATIENT)
Dept: MAMMOGRAPHY | Facility: IMAGING CENTER | Age: 72
End: 2022-07-20

## 2022-07-20 ENCOUNTER — OUTPATIENT (OUTPATIENT)
Dept: OUTPATIENT SERVICES | Facility: HOSPITAL | Age: 72
LOS: 1 days | End: 2022-07-20
Payer: COMMERCIAL

## 2022-07-20 DIAGNOSIS — Z98.890 OTHER SPECIFIED POSTPROCEDURAL STATES: Chronic | ICD-10-CM

## 2022-07-20 DIAGNOSIS — Z00.8 ENCOUNTER FOR OTHER GENERAL EXAMINATION: ICD-10-CM

## 2022-07-20 PROCEDURE — 76641 ULTRASOUND BREAST COMPLETE: CPT

## 2022-07-20 PROCEDURE — 77063 BREAST TOMOSYNTHESIS BI: CPT

## 2022-07-20 PROCEDURE — 76641 ULTRASOUND BREAST COMPLETE: CPT | Mod: 26,50

## 2022-07-20 PROCEDURE — 77063 BREAST TOMOSYNTHESIS BI: CPT | Mod: 26

## 2022-07-20 PROCEDURE — 77067 SCR MAMMO BI INCL CAD: CPT | Mod: 26

## 2022-07-20 PROCEDURE — 77067 SCR MAMMO BI INCL CAD: CPT

## 2022-08-10 ENCOUNTER — APPOINTMENT (OUTPATIENT)
Dept: UROGYNECOLOGY | Facility: CLINIC | Age: 72
End: 2022-08-10

## 2022-08-10 VITALS — TEMPERATURE: 97.4 F

## 2022-08-10 PROCEDURE — 99213 OFFICE O/P EST LOW 20 MIN: CPT

## 2022-09-08 ENCOUNTER — APPOINTMENT (OUTPATIENT)
Dept: CARDIOLOGY | Facility: CLINIC | Age: 72
End: 2022-09-08

## 2022-09-08 PROCEDURE — 93880 EXTRACRANIAL BILAT STUDY: CPT

## 2022-09-08 PROCEDURE — 93306 TTE W/DOPPLER COMPLETE: CPT

## 2022-09-21 ENCOUNTER — LABORATORY RESULT (OUTPATIENT)
Age: 72
End: 2022-09-21

## 2022-09-21 ENCOUNTER — APPOINTMENT (OUTPATIENT)
Dept: CARDIOLOGY | Facility: CLINIC | Age: 72
End: 2022-09-21

## 2022-09-21 VITALS
RESPIRATION RATE: 16 BRPM | WEIGHT: 121 LBS | BODY MASS INDEX: 22.26 KG/M2 | DIASTOLIC BLOOD PRESSURE: 79 MMHG | HEIGHT: 62 IN | HEART RATE: 64 BPM | OXYGEN SATURATION: 98 % | SYSTOLIC BLOOD PRESSURE: 126 MMHG | TEMPERATURE: 97.2 F

## 2022-09-21 PROCEDURE — 99213 OFFICE O/P EST LOW 20 MIN: CPT | Mod: 25

## 2022-09-21 PROCEDURE — 93015 CV STRESS TEST SUPVJ I&R: CPT

## 2022-09-21 NOTE — DISCUSSION/SUMMARY
[FreeTextEntry1] : This is a 72-year-old female with past medical history significant for hypercholesterolemia, status post back surgery (L4-L5), status post hysterectomy, who comes in for lipid/cardiac consultation.  She denies chest pain, shortness of breath, dizziness or syncope.  She may get occasional dyspnea on exertion.  She has no history of rheumatic fever.  She does not drink excessive caffeine or alcohol.  Her cardiac risk factors include hypercholesterolemia and family history of hypercholesterolemia (her mother and brother both have high cholesterol).\par The patient had a normal exercise stress test September 21, 2022.\par Echo Doppler examination done September 8, 2022 demonstrated mild mitral valve regurgitation, mild tricuspid valve regurgitation, minimal aortic valve regurgitation, aortic valve sclerosis, mild pulmonic valve regurgitation, normal ejection fraction 64%.\par \par Blood work done June 23, 2022 demonstrated total cholesterol 294 mg/dL,  mg/dL, HDL of 85 mg/dL and total cholesterol 78 mg/dL.\par Blood work done July 29, 2020 demonstrated total cholesterol 269 mg/dL, HDL of 77 mg/dL, LDL calculated 179 mg/dL, and triglycerides of 70 mg/dL.\par Her blood pressure is elevated today but she reports that she is always nervous in the doctor's office and manages her blood pressure at home which is within normal limits.\par  Given the possibility of heterozygous familial hypercholesterolemia, I would recommend an LDL target of less than 100 mg/dL.\par The patient's blood pressure is elevated today as well.  I have recommended she work with our registered dietitian to improve her lipid profile, and her salt intake.\par She is encouraged to continue on a regular aerobic activity.\par She is instructed follow-up with her primary care physician.  She will follow-up with me after the above-noted diagnostic tests are completed.\par The patient understands that aerobic exercises must be increased to 40 minutes 4 times per week. A detailed discussion of lifestyle modification was done today. The patient has a good understanding of the diagnosis, and treatment plan. Lifestyle modification was also outlined.\par \par Thank you for allowing participate in care of your patient.  Please do not hesitate to call if you have any further questions.

## 2022-09-21 NOTE — REASON FOR VISIT
[CV Risk Factors and Non-Cardiac Disease] : CV risk factors and non-cardiac disease [Hyperlipidemia] : hyperlipidemia [FreeTextEntry1] : This is a 72-year-old female with past medical history significant for hypercholesterolemia, status post back surgery (L4-L5), status post hysterectomy, who comes in for exercise stress test.  \par \par Patient states she is feeling well at this time and she denies chest pain, shortness of breath, dizziness or syncope.  She may get occasional dyspnea on exertion.  \par \par She has no history of rheumatic fever.  She does not drink excessive caffeine or alcohol.  Her cardiac risk factors include hypercholesterolemia and family history of hypercholesterolemia (her mother and brother both have high cholesterol).\par \par Blood work done July 12, 2022 demonstrated total cholesterol 282 mg/dL,  mg/dL, HDL of 93 mg/dL and non-HDL cholesterol 189 mg/dL. Apolipoprotein B 128 mg/dL. \par \par Patient is currently rosuvastatin 20mg daily.\par \par Patient had echocardiogram done 9/8/22 that showed mild mitral regurgitation, mild tricuspid regurgitation, and mild pulmonic regurgitation.\par

## 2022-09-21 NOTE — PHYSICAL EXAM
[Well Developed] : well developed [Well Nourished] : well nourished [No Acute Distress] : no acute distress [Normal Conjunctiva] : normal conjunctiva [Normal Venous Pressure] : normal venous pressure [No Carotid Bruit] : no carotid bruit [Normal S1, S2] : normal S1, S2 [No Rub] : no rub [5th Left ICS - MCL] : palpated at the 5th LICS in the midclavicular line [Normal] : normal [No Precordial Heave] : no precordial heave was noted [Normal Rate] : normal [Rhythm Regular] : regular [Normal S1] : normal S1 [Normal S2] : normal S2 [No Gallop] : no gallop heard [Click] : a ~M click was heard [II] : a grade 2 [I] : a grade 1 [No Pitting Edema] : no pitting edema present [2+] : left 2+ [No Abnormalities] : the abdominal aorta was not enlarged and no bruit was heard [Clear Lung Fields] : clear lung fields [Good Air Entry] : good air entry [No Respiratory Distress] : no respiratory distress  [Soft] : abdomen soft [Non Tender] : non-tender [No Masses/organomegaly] : no masses/organomegaly [Normal Bowel Sounds] : normal bowel sounds [Normal Gait] : normal gait [No Edema] : no edema [No Cyanosis] : no cyanosis [No Clubbing] : no clubbing [No Varicosities] : no varicosities [No Rash] : no rash [No Skin Lesions] : no skin lesions [Moves all extremities] : moves all extremities [No Focal Deficits] : no focal deficits [Normal Speech] : normal speech [Alert and Oriented] : alert and oriented [Normal memory] : normal memory [S3] : no S3 [S4] : no S4 [Right Carotid Bruit] : no bruit heard over the right carotid [Left Carotid Bruit] : no bruit heard over the left carotid [Right Femoral Bruit] : no bruit heard over the right femoral artery [Left Femoral Bruit] : no bruit heard over the left femoral artery

## 2022-09-29 ENCOUNTER — APPOINTMENT (OUTPATIENT)
Dept: UROGYNECOLOGY | Facility: CLINIC | Age: 72
End: 2022-09-29

## 2022-09-29 VITALS — TEMPERATURE: 96.8 F

## 2022-09-29 PROCEDURE — 99213 OFFICE O/P EST LOW 20 MIN: CPT

## 2022-10-06 ENCOUNTER — NON-APPOINTMENT (OUTPATIENT)
Age: 72
End: 2022-10-06

## 2022-10-06 ENCOUNTER — APPOINTMENT (OUTPATIENT)
Dept: OPHTHALMOLOGY | Facility: CLINIC | Age: 72
End: 2022-10-06

## 2022-10-06 PROCEDURE — 92133 CPTRZD OPH DX IMG PST SGM ON: CPT

## 2022-10-06 PROCEDURE — 92083 EXTENDED VISUAL FIELD XM: CPT

## 2022-10-06 PROCEDURE — 92012 INTRM OPH EXAM EST PATIENT: CPT

## 2022-10-08 ENCOUNTER — NON-APPOINTMENT (OUTPATIENT)
Age: 72
End: 2022-10-08

## 2022-10-11 ENCOUNTER — TRANSCRIPTION ENCOUNTER (OUTPATIENT)
Age: 72
End: 2022-10-11

## 2022-10-12 ENCOUNTER — NON-APPOINTMENT (OUTPATIENT)
Age: 72
End: 2022-10-12

## 2022-12-07 ENCOUNTER — LABORATORY RESULT (OUTPATIENT)
Age: 72
End: 2022-12-07

## 2022-12-07 ENCOUNTER — NON-APPOINTMENT (OUTPATIENT)
Age: 72
End: 2022-12-07

## 2022-12-07 ENCOUNTER — APPOINTMENT (OUTPATIENT)
Dept: CARDIOLOGY | Facility: CLINIC | Age: 72
End: 2022-12-07

## 2022-12-07 VITALS
TEMPERATURE: 97.3 F | SYSTOLIC BLOOD PRESSURE: 122 MMHG | HEIGHT: 61 IN | DIASTOLIC BLOOD PRESSURE: 68 MMHG | WEIGHT: 122 LBS | OXYGEN SATURATION: 94 % | RESPIRATION RATE: 16 BRPM | BODY MASS INDEX: 23.03 KG/M2 | HEART RATE: 67 BPM

## 2022-12-07 PROCEDURE — 93000 ELECTROCARDIOGRAM COMPLETE: CPT

## 2022-12-07 PROCEDURE — 99214 OFFICE O/P EST MOD 30 MIN: CPT | Mod: 25

## 2022-12-07 NOTE — ASSESSMENT
[FreeTextEntry1] : This is a 72 year year old female with a past medical history significant for hypercholesterolemia, status post back surgery (L4-L5), status post hysterectomy, who comes in for lipid/cardiac consultation.\par \par *She would like to make a note that Dr. Paredes is her cardiologist.\par \par CHIEF COMPLAINT:\par Today she is feeling generally well and does not have any complaints at this time.  She is currently prescribed rosuvastatin 20 mg daily for cholesterol management.\par \par She denies fever, chills, weight loss, malaise, rash, alteration bowel habits, weakness, abdominal  pain, bloating, changes in urination, visual disturbances, chest pain, headaches, dizziness, heart palpitations, recent episodes of syncope or falls at this time.\par \par She denies chest pain, shortness of breath, dizziness or syncope.  She may get occasional dyspnea on exertion.  She has no history of rheumatic fever.  She does not drink excessive caffeine or alcohol.  Her cardiac risk factors include hypercholesterolemia and family history of hypercholesterolemia (her mother and brother both have high cholesterol)\par \par BLOOD PRESSURE:\par -BP is well controlled in today's visit.\par \par -I have discussed the importance of maintaining good BP control and reviewed the newest guidelines with the patient while re-enforcing dietary sodium restrictions to no more than 2-3 g daily, DASH diet, life style modifications as well as the goal of maintaining ideal body weight with the patient today. I have advised the patient to avoid the use of over-the-counter medications/ supplements especially NSAIDS.\par \par BLOOD WORK:\par -New blood work was done 12/07/2022  to evaluate lipid profile, CBC, BMP, hepatic function, A1C and TSH. \par -New blood work was done September 21, 2022 demonstrated normal lipid profile with direct LDL of 58.\par Blood work done June 23, 2022 demonstrated total cholesterol 294 mg/dL,  mg/dL, HDL of 85 mg/dL and total cholesterol 78 mg/dL.\par -Blood work done July 29, 2020 demonstrated total cholesterol 269 mg/dL, HDL of 77 mg/dL, LDL calculated 179 mg/dL, and triglycerides of 70 mg/dL.\par \par CHOLESTEROL CONTROL:\par -Patient will continue the advised  TLC diet and to continue follow-up for treatment of hyperlipidemia and repeat blood testing with diet and exercise. I have discussed different exercises and the importance of maintenance of optimal body weight. The importance of staying within guidelines and recommendations was stressed to the patient today and they acknowledged that they understand this to me verbally.\par \par  -Ms. OWEN was educated and advised that failure to follow-up with my medical recommendations to lower cholesterol can result in severe health consequences therefore, they will continuing a low saturated and low fat diet and to avoid excessive carbohydrates to help reduce triglycerides and that lowering LDL levels is associated with a significant decrease in serious cardiac events including but not limited to heart attack stroke and overall death. We will continue lipid lowering agents as advised based on blood test results and the patient understands to call if they develop severe muscle discomfort or if they have a reddish tinted discoloration in their urine.\par \par TESTING/REPORTS:\par -EKG done 12/07/2022 which demonstrated regular sinus rhythm with nonspecific ST-T wave changes BPM of 67.\par \par -The patient had a normal exercise stress test September 21, 2022.\par \par -Echo Doppler examination done September 8, 2022 demonstrated mild mitral valve regurgitation, mild tricuspid valve regurgitation, minimal aortic valve regurgitation, aortic valve sclerosis, mild pulmonic valve regurgitation, normal ejection fraction 64%.\par \par PLAN:\par -She will continue with her current medications and will contact the office if she is having any complaints between now and their next follow up appointment.\par \par I have discussed the plan of care with Ms. SHADE OWEN  and she  will follow up in 3 months. She is compliant with all of her medications.\par \par The patient understands that aerobic exercises must be increased to minutes 4 times/week and a detailed discussion of lifestyle modification was done today. \par The patient has a good understanding of the diagnosis, treatment plan and lifestyle modification. \par She will contact me at the office for any questions with their care or any changes in their health status.\par \par \par Mihir SMITH

## 2022-12-07 NOTE — DISCUSSION/SUMMARY
[FreeTextEntry1] : Dr. Paredes-(PRIOR VISIT and PMH WITH Dr. Paredes): \par This is a 72-year-old female with past medical history significant for hypercholesterolemia, status post back surgery (L4-L5), status post hysterectomy, who comes in for lipid/cardiac consultation.  \par \par She denies chest pain, shortness of breath, dizziness or syncope.  She may get occasional dyspnea on exertion.  She has no history of rheumatic fever.  She does not drink excessive caffeine or alcohol.  Her cardiac risk factors include hypercholesterolemia and family history of hypercholesterolemia (her mother and brother both have high cholesterol).\par \par The patient had a normal exercise stress test September 21, 2022.\par \par Echo Doppler examination done September 8, 2022 demonstrated mild mitral valve regurgitation, mild tricuspid valve regurgitation, minimal aortic valve regurgitation, aortic valve sclerosis, mild pulmonic valve regurgitation, normal ejection fraction 64%.\par \par Blood work done June 23, 2022 demonstrated total cholesterol 294 mg/dL,  mg/dL, HDL of 85 mg/dL and total cholesterol 78 mg/dL.\par \par Blood work done July 29, 2020 demonstrated total cholesterol 269 mg/dL, HDL of 77 mg/dL, LDL calculated 179 mg/dL, and triglycerides of 70 mg/dL.\par \par Her blood pressure is elevated today but she reports that she is always nervous in the doctor's office and manages her blood pressure at home which is within normal limits.\par Given the possibility of heterozygous familial hypercholesterolemia, I would recommend an LDL target of less than 100 mg/dL.\par \par The patient's blood pressure is elevated today as well.  I have recommended she work with our registered dietitian to improve her lipid profile, and her salt intake.\par She is encouraged to continue on a regular aerobic activity.\par She is instructed follow-up with her primary care physician.  She will follow-up with me after the above-noted diagnostic tests are completed.\par The patient understands that aerobic exercises must be increased to 40 minutes 4 times per week. A detailed discussion of lifestyle modification was done today. The patient has a good understanding of the diagnosis, and treatment plan. Lifestyle modification was also outlined.\par \par Thank you for allowing participate in care of your patient.  Please do not hesitate to call if you have any further questions.

## 2023-01-04 ENCOUNTER — APPOINTMENT (OUTPATIENT)
Dept: UROGYNECOLOGY | Facility: CLINIC | Age: 73
End: 2023-01-04
Payer: COMMERCIAL

## 2023-01-04 DIAGNOSIS — R39.15 URGENCY OF URINATION: ICD-10-CM

## 2023-01-04 DIAGNOSIS — R35.0 FREQUENCY OF MICTURITION: ICD-10-CM

## 2023-01-04 PROCEDURE — 99213 OFFICE O/P EST LOW 20 MIN: CPT

## 2023-01-05 PROBLEM — R39.15 URINARY URGENCY: Status: ACTIVE | Noted: 2020-07-07

## 2023-01-05 PROBLEM — R35.0 URINARY FREQUENCY: Status: ACTIVE | Noted: 2020-12-09

## 2023-02-25 ENCOUNTER — TRANSCRIPTION ENCOUNTER (OUTPATIENT)
Age: 73
End: 2023-02-25

## 2023-04-05 ENCOUNTER — OUTPATIENT (OUTPATIENT)
Dept: OUTPATIENT SERVICES | Facility: HOSPITAL | Age: 73
LOS: 1 days | End: 2023-04-05
Payer: COMMERCIAL

## 2023-04-05 ENCOUNTER — TRANSCRIPTION ENCOUNTER (OUTPATIENT)
Age: 73
End: 2023-04-05

## 2023-04-05 ENCOUNTER — APPOINTMENT (OUTPATIENT)
Dept: RADIOLOGY | Facility: IMAGING CENTER | Age: 73
End: 2023-04-05
Payer: COMMERCIAL

## 2023-04-05 ENCOUNTER — APPOINTMENT (OUTPATIENT)
Dept: INTERNAL MEDICINE | Facility: CLINIC | Age: 73
End: 2023-04-05
Payer: COMMERCIAL

## 2023-04-05 ENCOUNTER — NON-APPOINTMENT (OUTPATIENT)
Age: 73
End: 2023-04-05

## 2023-04-05 VITALS
WEIGHT: 126 LBS | DIASTOLIC BLOOD PRESSURE: 87 MMHG | HEART RATE: 92 BPM | BODY MASS INDEX: 23.79 KG/M2 | TEMPERATURE: 98.2 F | HEIGHT: 61 IN | SYSTOLIC BLOOD PRESSURE: 150 MMHG | OXYGEN SATURATION: 95 %

## 2023-04-05 VITALS — TEMPERATURE: 102.4 F | DIASTOLIC BLOOD PRESSURE: 74 MMHG | SYSTOLIC BLOOD PRESSURE: 110 MMHG

## 2023-04-05 DIAGNOSIS — Z78.9 OTHER SPECIFIED HEALTH STATUS: ICD-10-CM

## 2023-04-05 DIAGNOSIS — J06.9 ACUTE UPPER RESPIRATORY INFECTION, UNSPECIFIED: ICD-10-CM

## 2023-04-05 DIAGNOSIS — Z98.890 OTHER SPECIFIED POSTPROCEDURAL STATES: Chronic | ICD-10-CM

## 2023-04-05 DIAGNOSIS — Z87.891 PERSONAL HISTORY OF NICOTINE DEPENDENCE: ICD-10-CM

## 2023-04-05 LAB
ALBUMIN SERPL ELPH-MCNC: 5 G/DL
ALP BLD-CCNC: 102 U/L
ALT SERPL-CCNC: 21 U/L
ANION GAP SERPL CALC-SCNC: 14 MMOL/L
AST SERPL-CCNC: 23 U/L
BASOPHILS # BLD AUTO: 0.03 K/UL
BASOPHILS NFR BLD AUTO: 0.3 %
BILIRUB SERPL-MCNC: 0.6 MG/DL
BUN SERPL-MCNC: 15 MG/DL
CALCIUM SERPL-MCNC: 10.1 MG/DL
CHLORIDE SERPL-SCNC: 101 MMOL/L
CO2 SERPL-SCNC: 25 MMOL/L
CREAT SERPL-MCNC: 0.84 MG/DL
CRP SERPL-MCNC: 45 MG/L
EGFR: 74 ML/MIN/1.73M2
EOSINOPHIL # BLD AUTO: 0.06 K/UL
EOSINOPHIL NFR BLD AUTO: 0.6 %
GLUCOSE SERPL-MCNC: 106 MG/DL
HCT VFR BLD CALC: 42.4 %
HGB BLD-MCNC: 13.9 G/DL
IMM GRANULOCYTES NFR BLD AUTO: 0.3 %
LYMPHOCYTES # BLD AUTO: 1.29 K/UL
LYMPHOCYTES NFR BLD AUTO: 12 %
MAN DIFF?: NORMAL
MCHC RBC-ENTMCNC: 31.8 PG
MCHC RBC-ENTMCNC: 32.8 GM/DL
MCV RBC AUTO: 97 FL
MONOCYTES # BLD AUTO: 1.26 K/UL
MONOCYTES NFR BLD AUTO: 11.7 %
NEUTROPHILS # BLD AUTO: 8.07 K/UL
NEUTROPHILS NFR BLD AUTO: 75.1 %
PLATELET # BLD AUTO: 234 K/UL
POTASSIUM SERPL-SCNC: 4.1 MMOL/L
PROT SERPL-MCNC: 7 G/DL
RBC # BLD: 4.37 M/UL
RBC # FLD: 12.4 %
SODIUM SERPL-SCNC: 141 MMOL/L
WBC # FLD AUTO: 10.74 K/UL

## 2023-04-05 PROCEDURE — 99213 OFFICE O/P EST LOW 20 MIN: CPT

## 2023-04-05 PROCEDURE — 71046 X-RAY EXAM CHEST 2 VIEWS: CPT | Mod: 26

## 2023-04-05 PROCEDURE — 71046 X-RAY EXAM CHEST 2 VIEWS: CPT

## 2023-04-05 NOTE — HISTORY OF PRESENT ILLNESS
[Cold Symptoms] : cold symptoms [Moderate] : moderate [___ Weeks ago] :  [unfilled] weeks ago [Gradual] : gradually [Constant] : constant [Congestion] : congestion [Cough] : cough [Sore Throat] : sore throat [Headache] : headache [Fever] : fever [Tmax= ___] : Tmax = [unfilled] [At Night] : at night [Stable] : stable [Wheezing] : no wheezing [Chills] : no chills [Anorexia] : no anorexia [Shortness Of Breath] : no shortness of breath [Earache] : no earache [Fatigue] : not fatigue [FreeTextEntry2] : runny nose and runny eyes, coughing up yellow sputum, feels achy, ears are clogged, no N/V/D, chest hurts with a deep breath, [FreeTextEntry5] : Claritin, Delsym and Flonase, Tylenol, [FreeTextEntry8] : She was tested negative for COVID this morning with home test.  She had 5 doses of COVID vaccine and had the flu vaccine.

## 2023-04-05 NOTE — PHYSICAL EXAM
[No Acute Distress] : no acute distress [Well Nourished] : well nourished [Well Developed] : well developed [Normal Sclera/Conjunctiva] : normal sclera/conjunctiva [PERRL] : pupils equal round and reactive to light [EOMI] : extraocular movements intact [Normal Outer Ear/Nose] : the outer ears and nose were normal in appearance [Normal TMs] : both tympanic membranes were normal [No Respiratory Distress] : no respiratory distress  [Normal Rate] : normal rate  [Regular Rhythm] : with a regular rhythm [Normal S1, S2] : normal S1 and S2 [No Edema] : there was no peripheral edema [Soft] : abdomen soft [Non Tender] : non-tender [Normal Bowel Sounds] : normal bowel sounds [Normal Supraclavicular Nodes] : no supraclavicular lymphadenopathy [Normal Posterior Cervical Nodes] : no posterior cervical lymphadenopathy [Normal Anterior Cervical Nodes] : no anterior cervical lymphadenopathy [No CVA Tenderness] : no CVA  tenderness [No Joint Swelling] : no joint swelling [Grossly Normal Strength/Tone] : grossly normal strength/tone [Normal Gait] : normal gait [Speech Grossly Normal] : speech grossly normal [Normal Affect] : the affect was normal [Alert and Oriented x3] : oriented to person, place, and time [Normal Mood] : the mood was normal [de-identified] : female in stated age,  [de-identified] : no sinus tenderness, nasal turbinates were congested, the pharynx was not erythematous, but has postnasal drip.  [de-identified] : Occ rhonchi, with decrease air entry, otherwise clear,

## 2023-04-06 LAB
ERYTHROCYTE [SEDIMENTATION RATE] IN BLOOD BY WESTERGREN METHOD: 28 MM/HR
INFLUENZA A RESULT: NOT DETECTED
INFLUENZA B RESULT: NOT DETECTED
RESP SYN VIRUS RESULT: NOT DETECTED
SARS-COV-2 RESULT: NOT DETECTED

## 2023-04-07 ENCOUNTER — RX RENEWAL (OUTPATIENT)
Age: 73
End: 2023-04-07

## 2023-04-10 DIAGNOSIS — J34.89 OTHER SPECIFIED DISORDERS OF NOSE AND NASAL SINUSES: ICD-10-CM

## 2023-04-14 RX ORDER — PREDNISONE 20 MG/1
20 TABLET ORAL DAILY
Qty: 10 | Refills: 0 | Status: DISCONTINUED | COMMUNITY
Start: 2023-04-05 | End: 2023-04-14

## 2023-05-07 ENCOUNTER — RX RENEWAL (OUTPATIENT)
Age: 73
End: 2023-05-07

## 2023-06-06 ENCOUNTER — LABORATORY RESULT (OUTPATIENT)
Age: 73
End: 2023-06-06

## 2023-06-06 ENCOUNTER — APPOINTMENT (OUTPATIENT)
Dept: CARDIOLOGY | Facility: CLINIC | Age: 73
End: 2023-06-06
Payer: COMMERCIAL

## 2023-06-06 ENCOUNTER — NON-APPOINTMENT (OUTPATIENT)
Age: 73
End: 2023-06-06

## 2023-06-06 VITALS
TEMPERATURE: 97.8 F | DIASTOLIC BLOOD PRESSURE: 60 MMHG | WEIGHT: 124 LBS | BODY MASS INDEX: 23.41 KG/M2 | SYSTOLIC BLOOD PRESSURE: 110 MMHG | OXYGEN SATURATION: 96 % | HEIGHT: 61 IN | RESPIRATION RATE: 16 BRPM | HEART RATE: 58 BPM

## 2023-06-06 DIAGNOSIS — R03.0 ELEVATED BLOOD-PRESSURE READING, W/OUT DIAGNOSIS OF HYPERTENSION: ICD-10-CM

## 2023-06-06 PROCEDURE — 99214 OFFICE O/P EST MOD 30 MIN: CPT | Mod: 25

## 2023-06-06 PROCEDURE — 93000 ELECTROCARDIOGRAM COMPLETE: CPT

## 2023-06-06 RX ORDER — BUDESONIDE AND FORMOTEROL FUMARATE DIHYDRATE 80; 4.5 UG/1; UG/1
80-4.5 AEROSOL RESPIRATORY (INHALATION) TWICE DAILY
Qty: 1 | Refills: 1 | Status: DISCONTINUED | COMMUNITY
Start: 2023-04-10 | End: 2023-06-06

## 2023-06-06 NOTE — ASSESSMENT
[FreeTextEntry1] : This is a 73 year year old female with a past medical history significant for hypercholesterolemia, status post back surgery (L4-L5), status post hysterectomy, who comes in for lipid/cardiac consultation.\par \par *She would like to make a note that Dr. Paredes is her cardiologist.\par \par CHIEF COMPLAINT:\par Today she is feeling generally well and does not have any complaints at this time.  \par \par She is currently prescribed rosuvastatin 20 mg daily for cholesterol management.\par \par She denies fever, chills, weight loss, malaise, rash, alteration bowel habits, weakness, abdominal  pain, bloating, changes in urination, visual disturbances, chest pain, headaches, dizziness, heart palpitations, recent episodes of syncope or falls at this time.\par \par   She has no history of rheumatic fever.  She does not drink excessive caffeine or alcohol.  Her cardiac risk factors include hypercholesterolemia and family history of hypercholesterolemia (her mother and brother both have high cholesterol)\par \par BLOOD PRESSURE:\par -BP is well controlled in today's visit.\par \par BLOOD WORK:\par -New blood work was done 06/06/2023  to evaluate lipid profile, CBC, BMP, hepatic function, A1C and TSH. \par \par TESTING/REPORTS:\par -EKG done 06/06/2023 which demonstrated regular sinus rhythm with nonspecific ST-T wave changes BPM of \par 58.\par \par -EKG done 12/07/2022 which demonstrated regular sinus rhythm with nonspecific ST-T wave changes BPM of 67.\par \par -The patient had a normal exercise stress test September 21, 2022.\par \par -Echo Doppler examination done September 8, 2022 demonstrated mild mitral valve regurgitation, mild tricuspid valve regurgitation, minimal aortic valve regurgitation, aortic valve sclerosis, mild pulmonic valve regurgitation, normal ejection fraction 64%.\par \par PLAN:\par -The patient is clinically stable from a cardiac standpoint on today's exam. \par -She will continue with her current medications and will contact the office if she is having any complaints between now and their next follow up appointment.\par \par I have discussed the plan of care with Ms. SHADE OWEN  and she  will follow up in 4-6 months. She is compliant with all of her medications.\par \par The patient understands that aerobic exercises must be increased to minutes 4 times/week and a detailed discussion of lifestyle modification was done today. \par The patient has a good understanding of the diagnosis, treatment plan and lifestyle modification. \par She will contact me at the office for any questions with their care or any changes in their health status.\par \par \par Mihir SMITH

## 2023-06-12 ENCOUNTER — APPOINTMENT (OUTPATIENT)
Dept: ORTHOPEDIC SURGERY | Facility: CLINIC | Age: 73
End: 2023-06-12
Payer: COMMERCIAL

## 2023-06-12 VITALS — WEIGHT: 124 LBS | HEIGHT: 61 IN | BODY MASS INDEX: 23.41 KG/M2

## 2023-06-12 DIAGNOSIS — M22.41 CHONDROMALACIA PATELLAE, RIGHT KNEE: ICD-10-CM

## 2023-06-12 DIAGNOSIS — Z78.9 OTHER SPECIFIED HEALTH STATUS: ICD-10-CM

## 2023-06-12 DIAGNOSIS — M22.42 CHONDROMALACIA PATELLAE, LEFT KNEE: ICD-10-CM

## 2023-06-12 DIAGNOSIS — M11.262 OTHER CHONDROCALCINOSIS, LEFT KNEE: ICD-10-CM

## 2023-06-12 DIAGNOSIS — M11.261 OTHER CHONDROCALCINOSIS, RIGHT KNEE: ICD-10-CM

## 2023-06-12 PROCEDURE — 99203 OFFICE O/P NEW LOW 30 MIN: CPT

## 2023-06-12 PROCEDURE — 73562 X-RAY EXAM OF KNEE 3: CPT | Mod: 50

## 2023-06-12 NOTE — DISCUSSION/SUMMARY
[de-identified] : The patient will avoid irritating activities and exercises\par Ice as needed\par Tylenol as needed.  She will discontinue Motrin because this bothers her stomach\par Because of complaints of pain right posterior knee, she will venous Doppler to rule out popliteal cyst, DVT.  I did explain to her that if positive for DVT, this can be potentially life-threatening\par I discussed Euflexxa injections and gave her a pamphlet.  Risk benefits and the alternatives were discussed.  She would like to do this\par She is going to be going to [facility] August 10\par \par Impression:\par Mild to moderate osteoarthritis right knee/chondromalacia patella/chondrocalcinosis\par Mild to moderate osteoarthritis left knee/chondromalacia patella/chondrocalcinosis

## 2023-06-12 NOTE — HISTORY OF PRESENT ILLNESS
[Gradual] : gradual [7] : 7 [Dull/Aching] : dull/aching [Intermittent] : intermittent [Meds] : meds [Retired] : Work status: retired [de-identified] : Patient is a 73-year-old female with pain in both knees over the past 2 months.  No injury.  She does exercise and ballet on a regular basis.  She has mild pain standing and walking.  Mild to moderate pain with stairs and movie sign.  Occasional pain behind her right knee.  She takes Motrin sparingly.  This bothers her stomach.  Referred by her  Germaine Martinez [] : Post Surgical Visit: no [FreeTextEntry9] : Motrin

## 2023-06-12 NOTE — IMAGING
[Bilateral] : knee bilaterally [de-identified] : Normal gait\par \par Right knee\par No swelling\par Mild medial facet and joint line tenderness\par Passive range of motion 0 degrees to 130 degrees\par Ligaments are stable\par Quad strength 5/5\par \par Left knee\par No swelling\par Mild medial facet and joint line tenderness\par Passive range of motion 0 degrees to 130 degrees\par Ligaments are stable\par Quad strength 5/5\par \par Both legs\par No swelling\par Calves are soft and nontender\par Posterior tibial pulse 2+ bilaterally [FreeTextEntry9] : Reviewed and interpreted.  Right knee AP standing, lateral, sunrise views-mild to moderate degenerative changes with narrowing of the medial compartment on AP standing view, spurring patellofemoral joint.  Mild lateral subluxation of the patella on sunrise view.  Calcification of cartilage\par \par Reviewed and interpreted.  Left knee AP standing, lateral, sunrise views-mild to moderate degenerative changes with narrowing of the medial compartment on AP standing view, spurring patellofemoral joint.  Mild lateral subluxation of the patella on sunrise view.  Calcification of cartilage

## 2023-06-13 ENCOUNTER — OUTPATIENT (OUTPATIENT)
Dept: OUTPATIENT SERVICES | Facility: HOSPITAL | Age: 73
LOS: 1 days | End: 2023-06-13
Payer: COMMERCIAL

## 2023-06-13 ENCOUNTER — RESULT REVIEW (OUTPATIENT)
Age: 73
End: 2023-06-13

## 2023-06-13 ENCOUNTER — APPOINTMENT (OUTPATIENT)
Dept: ULTRASOUND IMAGING | Facility: CLINIC | Age: 73
End: 2023-06-13
Payer: COMMERCIAL

## 2023-06-13 DIAGNOSIS — M17.0 BILATERAL PRIMARY OSTEOARTHRITIS OF KNEE: ICD-10-CM

## 2023-06-13 DIAGNOSIS — Z98.890 OTHER SPECIFIED POSTPROCEDURAL STATES: Chronic | ICD-10-CM

## 2023-06-13 PROCEDURE — 93971 EXTREMITY STUDY: CPT | Mod: 26,RT

## 2023-06-13 PROCEDURE — 93971 EXTREMITY STUDY: CPT

## 2023-07-05 ENCOUNTER — APPOINTMENT (OUTPATIENT)
Dept: ORTHOPEDIC SURGERY | Facility: CLINIC | Age: 73
End: 2023-07-05
Payer: COMMERCIAL

## 2023-07-05 VITALS — BODY MASS INDEX: 23.41 KG/M2 | HEIGHT: 61 IN | WEIGHT: 124 LBS

## 2023-07-05 PROCEDURE — 20611 DRAIN/INJ JOINT/BURSA W/US: CPT | Mod: 50

## 2023-07-05 RX ORDER — HYALURONATE SODIUM 20 MG/2 ML
20 SYRINGE (ML) INTRAARTICULAR
Refills: 0 | Status: COMPLETED | OUTPATIENT
Start: 2023-07-05

## 2023-07-05 RX ADMIN — Medication MG/2ML: at 00:00

## 2023-07-05 NOTE — PROCEDURE
[Large Joint Injection] : Large joint injection [Bilateral] : bilaterally of the [Knee] : knee [Pain] : pain [Alcohol] : alcohol [Ethyl Chloride sprayed topically] : ethyl chloride sprayed topically [Sterile technique used] : sterile technique used [Euflexxa(20mg)] : 20mg of Euflexxa [#1] : series #1 [] : Patient tolerated procedure well [Patient was advised to rest the joint(s) for ____ days] : patient was advised to rest the joint(s) for [unfilled] days [Risks, benefits, alternatives discussed / Verbal consent obtained] : the risks benefits, and alternatives have been discussed, and verbal consent was obtained [All ultrasound images have been permanently captured and stored accordingly in our picture archiving and communication system] : All ultrasound images have been permanently captured and stored accordingly in our picture archiving and communication system [de-identified] : Chlorhexidine [de-identified] : To ensure intra-articular injections [FreeTextEntry3] : Do not submerge underwater for 24 hours

## 2023-07-05 NOTE — HISTORY OF PRESENT ILLNESS
[Gradual] : gradual [3] : 3 [Dull/Aching] : dull/aching [Intermittent] : intermittent [Leisure] : leisure [Rest] : rest [Stairs] : stairs [Retired] : Work status: retired [1] : 1 [Euflexxa] : Euflexxa [de-identified] : The patient has continued pain in both knees.  Mild pain standing and walking.  Mild to moderate pain after sitting and getting up [] : Post Surgical Visit: no [de-identified] : 6/12/2023 [de-identified] : Dr. Chavez

## 2023-07-05 NOTE — DISCUSSION/SUMMARY
[de-identified] : The patient will avoid irritating activities and exercises\par Ice as needed\par Tylenol as needed.  \par She would like to proceed with Euflexxa injections in both knees.  Risk benefits and the alternatives were discussed\par [facility]\par \par Impression:\par Mild to moderate osteoarthritis right knee/chondromalacia patella/chondrocalcinosis\par Mild to moderate osteoarthritis left knee/chondromalacia patella/chondrocalcinosis

## 2023-07-05 NOTE — IMAGING
[de-identified] : Normal gait\par \par Right knee\par No swelling\par Mild medial facet and joint line tenderness\par Passive range of motion 0 degrees to 130 degrees\par \par Left knee\par No swelling\par Mild medial facet and joint line tenderness\par Passive range of motion 0 degrees to 130 degrees\par \par Both legs\par No swelling\par Calves are soft and nontender\par

## 2023-07-12 ENCOUNTER — APPOINTMENT (OUTPATIENT)
Dept: ORTHOPEDIC SURGERY | Facility: CLINIC | Age: 73
End: 2023-07-12
Payer: COMMERCIAL

## 2023-07-12 VITALS — WEIGHT: 124 LBS | HEIGHT: 61 IN | BODY MASS INDEX: 23.41 KG/M2

## 2023-07-12 PROCEDURE — 20611 DRAIN/INJ JOINT/BURSA W/US: CPT | Mod: 50

## 2023-07-12 RX ORDER — HYALURONATE SODIUM 20 MG/2 ML
20 SYRINGE (ML) INTRAARTICULAR
Refills: 0 | Status: COMPLETED | OUTPATIENT
Start: 2023-07-12

## 2023-07-12 RX ADMIN — Medication MG/2ML: at 00:00

## 2023-07-12 NOTE — PROCEDURE
[Large Joint Injection] : Large joint injection [Bilateral] : bilaterally of the [Knee] : knee [Pain] : pain [Alcohol] : alcohol [Ethyl Chloride sprayed topically] : ethyl chloride sprayed topically [Sterile technique used] : sterile technique used [Euflexxa(20mg)] : 20mg of Euflexxa [#2] : series #2 [] : Patient tolerated procedure well [Patient was advised to rest the joint(s) for ____ days] : patient was advised to rest the joint(s) for [unfilled] days [Risks, benefits, alternatives discussed / Verbal consent obtained] : the risks benefits, and alternatives have been discussed, and verbal consent was obtained [All ultrasound images have been permanently captured and stored accordingly in our picture archiving and communication system] : All ultrasound images have been permanently captured and stored accordingly in our picture archiving and communication system [de-identified] : Chlorhexidine [de-identified] : To ensure intra-articular injections [FreeTextEntry3] : Do not submerge underwater for 24 hours

## 2023-07-12 NOTE — DISCUSSION/SUMMARY
[de-identified] : The patient will avoid irritating activities and exercises\par Ice as needed\par Tylenol as needed.  \par She is going to be going to [place] August 10\par \par Impression:\par Mild to moderate osteoarthritis right knee/chondromalacia patella/chondrocalcinosis\par Mild to moderate osteoarthritis left knee/chondromalacia patella/chondrocalcinosis

## 2023-07-12 NOTE — IMAGING
[de-identified] : Normal gait\par \par Right knee\par No swelling\par Mild medial facet and joint line tenderness\par Passive range of motion 0 degrees to 130 degrees\par \par Left knee\par No swelling\par Mild medial facet and joint line tenderness\par Passive range of motion 0 degrees to 130 degrees\par \par Both legs\par No swelling\par Calves are soft and nontender\par

## 2023-07-12 NOTE — HISTORY OF PRESENT ILLNESS
[Gradual] : gradual [3] : 3 [Dull/Aching] : dull/aching [Intermittent] : intermittent [Rest] : rest [Stairs] : stairs [2] : 2 [Euflexxa] : Euflexxa [de-identified] : The patient has continued pain in both knees.  Mild pain standing and walking.  No change after the first Euflexxa injections [] : Post Surgical Visit: no [de-identified] : 7/5/23 [de-identified] : Dr. Chavez [de-identified] : 7/5/23

## 2023-07-19 ENCOUNTER — APPOINTMENT (OUTPATIENT)
Dept: ORTHOPEDIC SURGERY | Facility: CLINIC | Age: 73
End: 2023-07-19
Payer: COMMERCIAL

## 2023-07-19 DIAGNOSIS — M17.0 BILATERAL PRIMARY OSTEOARTHRITIS OF KNEE: ICD-10-CM

## 2023-07-19 PROCEDURE — 20611 DRAIN/INJ JOINT/BURSA W/US: CPT | Mod: 50

## 2023-07-19 RX ORDER — HYALURONATE SODIUM 20 MG/2 ML
20 SYRINGE (ML) INTRAARTICULAR
Refills: 0 | Status: COMPLETED | OUTPATIENT
Start: 2023-07-19

## 2023-07-19 RX ADMIN — Medication MG/2ML: at 00:00

## 2023-07-19 NOTE — PROCEDURE
[Large Joint Injection] : Large joint injection [Bilateral] : bilaterally of the [Knee] : knee [Pain] : pain [Alcohol] : alcohol [Ethyl Chloride sprayed topically] : ethyl chloride sprayed topically [Sterile technique used] : sterile technique used [Euflexxa(20mg)] : 20mg of Euflexxa [#3] : series #3 [] : Patient tolerated procedure well [Patient was advised to rest the joint(s) for ____ days] : patient was advised to rest the joint(s) for [unfilled] days [Risks, benefits, alternatives discussed / Verbal consent obtained] : the risks benefits, and alternatives have been discussed, and verbal consent was obtained [All ultrasound images have been permanently captured and stored accordingly in our picture archiving and communication system] : All ultrasound images have been permanently captured and stored accordingly in our picture archiving and communication system [de-identified] : Chlorhexidine [de-identified] : To ensure intra-articular injections [FreeTextEntry3] : Do not submerge underwater for 24 hours

## 2023-07-19 NOTE — HISTORY OF PRESENT ILLNESS
[Gradual] : gradual [Dull/Aching] : dull/aching [Intermittent] : intermittent [Rest] : rest [Stairs] : stairs [3] : 3 [Euflexxa] : Euflexxa [de-identified] : The patient has continued mild pain in both knees standing and walking.  No change after the second Euflexxa injections [] : Post Surgical Visit: no [de-identified] : 7/12/23

## 2023-07-19 NOTE — IMAGING
[de-identified] : Normal gait\par \par Right knee\par No swelling\par Mild medial facet and joint line tenderness\par Passive range of motion 0 degrees to 130 degrees\par \par Left knee\par No swelling\par Mild medial facet and joint line tenderness\par Passive range of motion 0 degrees to 130 degrees\par \par Both legs\par No swelling\par Calves are soft and nontender\par

## 2023-07-19 NOTE — DISCUSSION/SUMMARY
[de-identified] : Follow-up plan was outlined and reviewed with the patient\par I discussed possible cortisone injections if ongoing symptoms\par I discussed possible physical therapy\par The patient will avoid irritating activities and exercises\par Ice as needed\par Tylenol as needed.  \par She is going to be going to [place] August 10\par \par Impression:\par Mild to moderate osteoarthritis right knee/chondromalacia patella/chondrocalcinosis\par Mild to moderate osteoarthritis left knee/chondromalacia patella/chondrocalcinosis

## 2023-07-24 ENCOUNTER — APPOINTMENT (OUTPATIENT)
Dept: MAMMOGRAPHY | Facility: CLINIC | Age: 73
End: 2023-07-24
Payer: COMMERCIAL

## 2023-07-24 ENCOUNTER — APPOINTMENT (OUTPATIENT)
Dept: ULTRASOUND IMAGING | Facility: CLINIC | Age: 73
End: 2023-07-24
Payer: COMMERCIAL

## 2023-07-24 PROCEDURE — 76641 ULTRASOUND BREAST COMPLETE: CPT | Mod: 50

## 2023-07-24 PROCEDURE — 77067 SCR MAMMO BI INCL CAD: CPT

## 2023-07-24 PROCEDURE — 77063 BREAST TOMOSYNTHESIS BI: CPT

## 2023-07-25 ENCOUNTER — APPOINTMENT (OUTPATIENT)
Dept: INTERNAL MEDICINE | Facility: CLINIC | Age: 73
End: 2023-07-25
Payer: COMMERCIAL

## 2023-07-25 VITALS
HEIGHT: 61 IN | BODY MASS INDEX: 23.41 KG/M2 | DIASTOLIC BLOOD PRESSURE: 78 MMHG | SYSTOLIC BLOOD PRESSURE: 131 MMHG | HEART RATE: 58 BPM | OXYGEN SATURATION: 97 % | TEMPERATURE: 96.4 F | WEIGHT: 124 LBS

## 2023-07-25 DIAGNOSIS — Z13.820 ENCOUNTER FOR SCREENING FOR OSTEOPOROSIS: ICD-10-CM

## 2023-07-25 DIAGNOSIS — R05.9 COUGH, UNSPECIFIED: ICD-10-CM

## 2023-07-25 DIAGNOSIS — Z00.00 ENCOUNTER FOR GENERAL ADULT MEDICAL EXAMINATION W/OUT ABNORMAL FINDINGS: ICD-10-CM

## 2023-07-25 DIAGNOSIS — Z12.11 ENCOUNTER FOR SCREENING FOR MALIGNANT NEOPLASM OF COLON: ICD-10-CM

## 2023-07-25 PROCEDURE — 99397 PER PM REEVAL EST PAT 65+ YR: CPT

## 2023-07-25 RX ORDER — EPINEPHRINE 0.3 MG/.3ML
0.3 INJECTION INTRAMUSCULAR
Qty: 1 | Refills: 2 | Status: ACTIVE | COMMUNITY
Start: 2023-07-25 | End: 1900-01-01

## 2023-10-10 ENCOUNTER — NON-APPOINTMENT (OUTPATIENT)
Age: 73
End: 2023-10-10

## 2023-10-10 ENCOUNTER — APPOINTMENT (OUTPATIENT)
Dept: OPHTHALMOLOGY | Facility: CLINIC | Age: 73
End: 2023-10-10
Payer: COMMERCIAL

## 2023-10-10 PROCEDURE — 92133 CPTRZD OPH DX IMG PST SGM ON: CPT

## 2023-10-10 PROCEDURE — 92083 EXTENDED VISUAL FIELD XM: CPT

## 2023-10-10 PROCEDURE — 92014 COMPRE OPH EXAM EST PT 1/>: CPT

## 2023-11-28 ENCOUNTER — LABORATORY RESULT (OUTPATIENT)
Age: 73
End: 2023-11-28

## 2023-11-28 ENCOUNTER — APPOINTMENT (OUTPATIENT)
Dept: CARDIOLOGY | Facility: CLINIC | Age: 73
End: 2023-11-28
Payer: COMMERCIAL

## 2023-11-28 ENCOUNTER — NON-APPOINTMENT (OUTPATIENT)
Age: 73
End: 2023-11-28

## 2023-11-28 VITALS
RESPIRATION RATE: 16 BRPM | HEART RATE: 87 BPM | WEIGHT: 124 LBS | BODY MASS INDEX: 22.82 KG/M2 | HEIGHT: 62 IN | TEMPERATURE: 97.5 F | OXYGEN SATURATION: 98 %

## 2023-11-28 VITALS
WEIGHT: 124 LBS | HEART RATE: 87 BPM | DIASTOLIC BLOOD PRESSURE: 76 MMHG | OXYGEN SATURATION: 98 % | SYSTOLIC BLOOD PRESSURE: 122 MMHG | HEIGHT: 62 IN | BODY MASS INDEX: 22.82 KG/M2 | RESPIRATION RATE: 16 BRPM | TEMPERATURE: 97.5 F

## 2023-11-28 DIAGNOSIS — R06.09 OTHER FORMS OF DYSPNEA: ICD-10-CM

## 2023-11-28 PROCEDURE — 93000 ELECTROCARDIOGRAM COMPLETE: CPT

## 2023-11-28 PROCEDURE — 99214 OFFICE O/P EST MOD 30 MIN: CPT | Mod: 25

## 2023-11-30 ENCOUNTER — NON-APPOINTMENT (OUTPATIENT)
Age: 73
End: 2023-11-30

## 2023-12-01 ENCOUNTER — TRANSCRIPTION ENCOUNTER (OUTPATIENT)
Age: 73
End: 2023-12-01

## 2023-12-07 ENCOUNTER — TRANSCRIPTION ENCOUNTER (OUTPATIENT)
Age: 73
End: 2023-12-07

## 2023-12-19 ENCOUNTER — NON-APPOINTMENT (OUTPATIENT)
Age: 73
End: 2023-12-19

## 2023-12-20 ENCOUNTER — TRANSCRIPTION ENCOUNTER (OUTPATIENT)
Age: 73
End: 2023-12-20

## 2024-01-04 ENCOUNTER — APPOINTMENT (OUTPATIENT)
Dept: OPHTHALMOLOGY | Facility: CLINIC | Age: 74
End: 2024-01-04
Payer: COMMERCIAL

## 2024-01-04 ENCOUNTER — NON-APPOINTMENT (OUTPATIENT)
Age: 74
End: 2024-01-04

## 2024-01-04 PROCEDURE — 92012 INTRM OPH EXAM EST PATIENT: CPT

## 2024-02-16 ENCOUNTER — APPOINTMENT (OUTPATIENT)
Dept: INTERNAL MEDICINE | Facility: CLINIC | Age: 74
End: 2024-02-16
Payer: COMMERCIAL

## 2024-02-16 DIAGNOSIS — T70.20XA UNSPECIFIED EFFECTS OF HIGH ALTITUDE, INITIAL ENCOUNTER: ICD-10-CM

## 2024-02-16 DIAGNOSIS — G43.909 MIGRAINE, UNSPECIFIED, NOT INTRACTABLE, W/OUT STATUS MIGRAINOSUS: ICD-10-CM

## 2024-02-16 PROCEDURE — 99443: CPT

## 2024-02-16 RX ORDER — AMOXICILLIN AND CLAVULANATE POTASSIUM 875; 125 MG/1; MG/1
875-125 TABLET, COATED ORAL
Qty: 14 | Refills: 0 | Status: DISCONTINUED | COMMUNITY
Start: 2023-04-10 | End: 2024-02-16

## 2024-02-16 RX ORDER — BUTALBITAL, ACETAMINOPHEN AND CAFFEINE 300; 50; 40 MG/1; MG/1; MG/1
50-300-40 CAPSULE ORAL
Qty: 10 | Refills: 0 | Status: ACTIVE | COMMUNITY
Start: 2024-02-16 | End: 1900-01-01

## 2024-02-16 RX ORDER — ACETAZOLAMIDE 125 MG/1
125 TABLET ORAL TWICE DAILY
Qty: 10 | Refills: 0 | Status: ACTIVE | COMMUNITY
Start: 2024-02-16 | End: 1900-01-01

## 2024-03-05 NOTE — ED ADULT TRIAGE NOTE - SOURCE OF INFORMATION
EMS/Patient This document is complete and the patient is ready for discharge.

## 2024-03-13 ENCOUNTER — APPOINTMENT (OUTPATIENT)
Dept: UROGYNECOLOGY | Facility: CLINIC | Age: 74
End: 2024-03-13
Payer: COMMERCIAL

## 2024-03-13 DIAGNOSIS — N39.0 URINARY TRACT INFECTION, SITE NOT SPECIFIED: ICD-10-CM

## 2024-03-13 DIAGNOSIS — N95.2 POSTMENOPAUSAL ATROPHIC VAGINITIS: ICD-10-CM

## 2024-03-13 PROCEDURE — 99213 OFFICE O/P EST LOW 20 MIN: CPT

## 2024-03-13 RX ORDER — ESTRADIOL 10 UG/1
10 TABLET VAGINAL
Qty: 36 | Refills: 3 | Status: ACTIVE | COMMUNITY
Start: 2022-05-09

## 2024-03-14 NOTE — DISCUSSION/SUMMARY
[FreeTextEntry1] : #OAB: - Reviewed behavioral modifications and bladder training.   - Pt will continue conservative managements for now.   #Vaginal atrophy/Recurrent UTI: -Continue vaginal estrogen use at this time  #Dispo: -RTO in 12 months or sooner if needed  All questions answered to the patient's satisfaction.  She expressed understanding. She knows to contact the office with any questions or concerns.

## 2024-03-14 NOTE — PHYSICAL EXAM
[No Acute Distress] : in no acute distress [Well developed] : well developed [Well Nourished] : ~L well nourished [Good Hygeine] : demonstrates good hygeine [Normal Memory] : ~T memory was ~L unimpaired [Oriented x3] : oriented to person, place, and time [Normal Mood/Affect] : mood and affect are normal [Warm and Dry] : was warm and dry to touch [Normal Gait] : gait was normal [Vulvar Atrophy] : vulvar atrophy [Labia Majora] : were normal [Labia Minora] : were normal [Normal] : was normal [Atrophy] : atrophy [Dry Mucosa] : dry mucosa [No Bleeding] : there was no active vaginal bleeding [Anxiety] : patient is not anxious

## 2024-03-14 NOTE — HISTORY OF PRESENT ILLNESS
[FreeTextEntry1] : Tiff is 71 yo with known history of vaginal atrophy, recurrent UTI, and OAB.  She was last seen in this office on 01/04/23.  She presents today for follow-up.  For her vaginal atrophy and history of recurrent UTI, she has been using vaginal estrogen three times per week.  She is very happy with the outcome of this treatment.  She has no complaints of dysuria or vaginal bleeding.  For her OAB, she has been adhering to the OAB diet and behavioral modification.  She reports that some days are better than others. She experiences occasional UUI.

## 2024-04-18 ENCOUNTER — NON-APPOINTMENT (OUTPATIENT)
Age: 74
End: 2024-04-18

## 2024-04-18 ENCOUNTER — APPOINTMENT (OUTPATIENT)
Dept: OPHTHALMOLOGY | Facility: CLINIC | Age: 74
End: 2024-04-18
Payer: COMMERCIAL

## 2024-04-18 PROCEDURE — 92012 INTRM OPH EXAM EST PATIENT: CPT

## 2024-04-18 PROCEDURE — 92133 CPTRZD OPH DX IMG PST SGM ON: CPT

## 2024-05-28 ENCOUNTER — LABORATORY RESULT (OUTPATIENT)
Age: 74
End: 2024-05-28

## 2024-05-28 ENCOUNTER — NON-APPOINTMENT (OUTPATIENT)
Age: 74
End: 2024-05-28

## 2024-05-28 ENCOUNTER — APPOINTMENT (OUTPATIENT)
Dept: CARDIOLOGY | Facility: CLINIC | Age: 74
End: 2024-05-28
Payer: COMMERCIAL

## 2024-05-28 VITALS
SYSTOLIC BLOOD PRESSURE: 146 MMHG | DIASTOLIC BLOOD PRESSURE: 87 MMHG | HEART RATE: 78 BPM | RESPIRATION RATE: 16 BRPM | TEMPERATURE: 97.6 F | OXYGEN SATURATION: 96 %

## 2024-05-28 VITALS
RESPIRATION RATE: 16 BRPM | TEMPERATURE: 97.6 F | SYSTOLIC BLOOD PRESSURE: 130 MMHG | OXYGEN SATURATION: 96 % | DIASTOLIC BLOOD PRESSURE: 87 MMHG | BODY MASS INDEX: 24.19 KG/M2 | HEIGHT: 62 IN | HEART RATE: 78 BPM | WEIGHT: 131.44 LBS

## 2024-05-28 DIAGNOSIS — I07.1 RHEUMATIC TRICUSPID INSUFFICIENCY: ICD-10-CM

## 2024-05-28 DIAGNOSIS — I65.29 OCCLUSION AND STENOSIS OF UNSPECIFIED CAROTID ARTERY: ICD-10-CM

## 2024-05-28 DIAGNOSIS — I34.0 NONRHEUMATIC MITRAL (VALVE) INSUFFICIENCY: ICD-10-CM

## 2024-05-28 DIAGNOSIS — E78.01 FAMILIAL HYPERCHOLESTEROLEMIA: ICD-10-CM

## 2024-05-28 DIAGNOSIS — R03.0 ELEVATED BLOOD-PRESSURE READING, W/OUT DIAGNOSIS OF HYPERTENSION: ICD-10-CM

## 2024-05-28 DIAGNOSIS — E78.00 PURE HYPERCHOLESTEROLEMIA, UNSPECIFIED: ICD-10-CM

## 2024-05-28 PROCEDURE — 93000 ELECTROCARDIOGRAM COMPLETE: CPT

## 2024-05-28 PROCEDURE — G2211 COMPLEX E/M VISIT ADD ON: CPT

## 2024-05-28 PROCEDURE — 99214 OFFICE O/P EST MOD 30 MIN: CPT

## 2024-05-28 NOTE — ASSESSMENT
[FreeTextEntry1] : Prior note nurse practitioner Esme June 6, 2023::  This is a 73 year year old female with a past medical history significant for hypercholesterolemia, status post back surgery (L4-L5), status post hysterectomy, who comes in for lipid/cardiac consultation.    *She would like to make a note that Dr. Paredes is her cardiologist.    CHIEF COMPLAINT:  Today she is feeling generally well and does not have any complaints at this time.    She is currently prescribed rosuvastatin 20 mg daily for cholesterol management.    She denies fever, chills, weight loss, malaise, rash, alteration bowel habits, weakness, abdominal pain, bloating, changes in urination, visual disturbances, chest pain, headaches, dizziness, heart palpitations, recent episodes of syncope or falls at this time.     She has no history of rheumatic fever. She does not drink excessive caffeine or alcohol. Her cardiac risk factors include hypercholesterolemia and family history of hypercholesterolemia (her mother and brother both have high cholesterol)    BLOOD PRESSURE:  -BP is well controlled in today's visit.    BLOOD WORK:  -New blood work was done 06/06/2023 to evaluate lipid profile, CBC, BMP, hepatic function, A1C and TSH.    TESTING/REPORTS:  -EKG done 06/06/2023 which demonstrated regular sinus rhythm with nonspecific ST-T wave changes BPM of  58.    -EKG done 12/07/2022 which demonstrated regular sinus rhythm with nonspecific ST-T wave changes BPM of 67.    -The patient had a normal exercise stress test September 21, 2022.    -Echo Doppler examination done September 8, 2022 demonstrated mild mitral valve regurgitation, mild tricuspid valve regurgitation, minimal aortic valve regurgitation, aortic valve sclerosis, mild pulmonic valve regurgitation, normal ejection fraction 64%.    PLAN:  -The patient is clinically stable from a cardiac standpoint on today's exam.  -She will continue with her current medications and will contact the office if she is having any complaints between now and their next follow up appointment.    I have discussed the plan of care with Ms. SHADE OWEN and she will follow up in 4-6 months. She is compliant with all of her medications.    The patient understands that aerobic exercises must be increased to minutes 4 times/week and a detailed discussion of lifestyle modification was done today.  The patient has a good understanding of the diagnosis, treatment plan and lifestyle modification.  She will contact me at the office for any questions with their care or any changes in their health status.      Mihir SMITH.

## 2024-05-28 NOTE — REASON FOR VISIT
[CV Risk Factors and Non-Cardiac Disease] : CV risk factors and non-cardiac disease [Hyperlipidemia] : hyperlipidemia [FreeTextEntry1] : This is a 74-year-old female with past medical history significant for hypercholesterolemia, status post back surgery (L4-L5), status post hysterectomy, who comes in for lipid/cardiac follow up.  The patient feels well and has no complaints today. She denies chest pain, shortness of breath, dizziness or syncope. She has no history of rheumatic fever.  She does not drink excessive caffeine or alcohol.   Her cardiac risk factors include hypercholesterolemia and family history of hypercholesterolemia (her mother and brother both have high cholesterol).  BP 05/28/24: 130/87. Patient advised to wear 24 BP monitor. Blood work repeated in office 05/28/24   Bloodwork done 11/28/23 cholesterol 163, HDL 93, Triglycerides 65, LDL calc 57, non HDL 70, LDL direct 59 a1C 5.6  Bloodwork done 6/6/23: cholesterol 164, LDL calc 57, HDL 96, A1c 5.7% TTE 9/8/22 mild mitral tricuspid and pulmonic regurgitation   The patient had a normal exercise stress test September 21, 2022.

## 2024-05-28 NOTE — DISCUSSION/SUMMARY
[FreeTextEntry1] : This is a 74-year-old female with past medical history significant for hypercholesterolemia, status post back surgery (L4-L5), status post hysterectomy, who comes in for lipid/cardiac follow-up evaluation. She denies chest pain, shortness of breath, dizziness or syncope.   She has no history of rheumatic fever.  She does not drink excessive caffeine or alcohol.  Her cardiac risk factors include hypercholesterolemia and family history of hypercholesterolemia (her mother and brother both have high cholesterol). Electrocardiogram done May 28, 2024 demonstrated sinus bradycardia rate 58 bpm is otherwise remarkable for nonspecific ST wave changes. The patient blood pressure is elevated today.  She reports that her blood pressure is always elevated when she is in a physician's office. I have asked that she schedule an ambulatory blood pressure monitor to rule out whitecoat hypertension. The patient will have new blood work done today for lipid panel, electrolytes, TSH and hemoglobin A1c. She feels that she has gained some weight and is motivated to work with a registered dietitian Electrocardiogram done November 28, 2023 demonstrated sinus bradycardia rate of 55 bpm is otherwise remarkable for juvenile T wave pattern in V1 and V2. Lipid panel done June 6, 2023 demonstrated cholesterol 164, HDL 96, triglycerides 51, LDL direct 68, non-HDL cholesterol 68 mg/dL, LDL calculated 57 mg/dL and hemoglobin A1c of 5.7. The patient will continue on her current dose of Crestor 20 mg daily. The patient had a normal exercise stress test September 21, 2022.  Echo Doppler examination done September 8, 2022 demonstrated mild mitral valve regurgitation, mild tricuspid valve regurgitation, minimal aortic valve regurgitation, aortic valve sclerosis, mild pulmonic valve regurgitation, normal ejection fraction 64%.  Blood work done June 23, 2022 demonstrated total cholesterol 294 mg/dL,  mg/dL, HDL of 85 mg/dL and total cholesterol 78 mg/dL. Lipid panel done June 23, 2022 demonstrated cholesterol 294, triglycerides 78, HDL 85, LDL calculated 193 mg/dL and non-HDL cholesterol 209 mg/dL. Blood work done July 29, 2020 demonstrated total cholesterol 269 mg/dL, HDL of 77 mg/dL, LDL calculated 179 mg/dL, and triglycerides of 70 mg/dL.  Given the possibility of heterozygous familial hypercholesterolemia, I would recommend an LDL target of less than 100 mg/dL.  I have recommended she work with our registered dietitian to improve her lipid profile, and her salt intake. She is encouraged to continue on a regular aerobic activity. She is instructed follow-up with her primary care physician.   The patient understands that aerobic exercises must be increased to 40 minutes 4 times per week. A detailed discussion of lifestyle modification was done today. The patient has a good understanding of the diagnosis, and treatment plan. Lifestyle modification was also outlined.  Thank you for allowing participate in care of your patient.  Please do not hesitate to call if you have any further questions.

## 2024-06-17 ENCOUNTER — TRANSCRIPTION ENCOUNTER (OUTPATIENT)
Age: 74
End: 2024-06-17

## 2024-06-17 ENCOUNTER — APPOINTMENT (OUTPATIENT)
Dept: CARDIOLOGY | Facility: CLINIC | Age: 74
End: 2024-06-17
Payer: COMMERCIAL

## 2024-06-17 DIAGNOSIS — R03.0 ELEVATED BLOOD-PRESSURE READING, W/OUT DIAGNOSIS OF HYPERTENSION: ICD-10-CM

## 2024-06-17 PROCEDURE — 93784 AMBL BP MNTR W/SOFTWARE: CPT

## 2024-06-17 RX ORDER — ROSUVASTATIN CALCIUM 20 MG/1
20 TABLET, FILM COATED ORAL
Qty: 90 | Refills: 1 | Status: ACTIVE | COMMUNITY
Start: 2022-07-20 | End: 1900-01-01

## 2024-06-21 PROBLEM — R03.0 WHITE COAT SYNDROME WITHOUT DIAGNOSIS OF HYPERTENSION: Status: ACTIVE | Noted: 2024-05-28

## 2024-06-24 ENCOUNTER — APPOINTMENT (OUTPATIENT)
Dept: RADIOLOGY | Facility: CLINIC | Age: 74
End: 2024-06-24
Payer: COMMERCIAL

## 2024-06-24 PROCEDURE — 77080 DXA BONE DENSITY AXIAL: CPT

## 2024-06-27 ENCOUNTER — TRANSCRIPTION ENCOUNTER (OUTPATIENT)
Age: 74
End: 2024-06-27

## 2024-07-23 ENCOUNTER — NON-APPOINTMENT (OUTPATIENT)
Age: 74
End: 2024-07-23

## 2024-07-23 ENCOUNTER — APPOINTMENT (OUTPATIENT)
Dept: OPHTHALMOLOGY | Facility: CLINIC | Age: 74
End: 2024-07-23
Payer: SELF-PAY

## 2024-07-23 PROCEDURE — 92015 DETERMINE REFRACTIVE STATE: CPT

## 2024-07-29 ENCOUNTER — APPOINTMENT (OUTPATIENT)
Dept: ULTRASOUND IMAGING | Facility: CLINIC | Age: 74
End: 2024-07-29
Payer: COMMERCIAL

## 2024-07-29 ENCOUNTER — APPOINTMENT (OUTPATIENT)
Dept: MAMMOGRAPHY | Facility: CLINIC | Age: 74
End: 2024-07-29
Payer: COMMERCIAL

## 2024-07-29 PROCEDURE — 77067 SCR MAMMO BI INCL CAD: CPT

## 2024-07-29 PROCEDURE — 77063 BREAST TOMOSYNTHESIS BI: CPT

## 2024-07-29 PROCEDURE — 76641 ULTRASOUND BREAST COMPLETE: CPT | Mod: 50

## 2024-07-30 ENCOUNTER — APPOINTMENT (OUTPATIENT)
Dept: INTERNAL MEDICINE | Facility: CLINIC | Age: 74
End: 2024-07-30
Payer: COMMERCIAL

## 2024-07-30 VITALS — DIASTOLIC BLOOD PRESSURE: 72 MMHG | SYSTOLIC BLOOD PRESSURE: 116 MMHG

## 2024-07-30 VITALS
HEIGHT: 62 IN | TEMPERATURE: 97.9 F | WEIGHT: 124 LBS | DIASTOLIC BLOOD PRESSURE: 81 MMHG | BODY MASS INDEX: 22.82 KG/M2 | HEART RATE: 69 BPM | SYSTOLIC BLOOD PRESSURE: 144 MMHG | OXYGEN SATURATION: 97 %

## 2024-07-30 DIAGNOSIS — R73.03 PREDIABETES.: ICD-10-CM

## 2024-07-30 DIAGNOSIS — J30.2 OTHER SEASONAL ALLERGIC RHINITIS: ICD-10-CM

## 2024-07-30 DIAGNOSIS — M81.0 AGE-RELATED OSTEOPOROSIS W/OUT CURRENT PATHOLOGICAL FRACTURE: ICD-10-CM

## 2024-07-30 DIAGNOSIS — K21.9 GASTRO-ESOPHAGEAL REFLUX DISEASE W/OUT ESOPHAGITIS: ICD-10-CM

## 2024-07-30 DIAGNOSIS — Z00.00 ENCOUNTER FOR GENERAL ADULT MEDICAL EXAMINATION W/OUT ABNORMAL FINDINGS: ICD-10-CM

## 2024-07-30 DIAGNOSIS — E78.00 PURE HYPERCHOLESTEROLEMIA, UNSPECIFIED: ICD-10-CM

## 2024-07-30 PROCEDURE — 99397 PER PM REEVAL EST PAT 65+ YR: CPT

## 2024-07-30 PROCEDURE — 99214 OFFICE O/P EST MOD 30 MIN: CPT | Mod: 25

## 2024-07-30 PROCEDURE — 36415 COLL VENOUS BLD VENIPUNCTURE: CPT

## 2024-07-30 NOTE — HEALTH RISK ASSESSMENT
[Former] : Former [0-4] : 0-4 [> 15 Years] : > 15 Years [Yes] : Yes [2 - 3 times a week (3 pts)] : 2 - 3  times a week (3 points) [1 or 2 (0 pts)] : 1 or 2 (0 points) [Never (0 pts)] : Never (0 points) [No] : In the past 12 months have you used drugs other than those required for medical reasons? No [One fall no injury in past year] : Patient reported one fall in the past year without injury [0] : 2) Feeling down, depressed, or hopeless: Not at all (0) [PHQ-2 Negative - No further assessment needed] : PHQ-2 Negative - No further assessment needed [Retired] : retired [] :  [# Of Children ___] : has [unfilled] children [Feels Safe at Home] : Feels safe at home [Fully functional (bathing, dressing, toileting, transferring, walking, feeding)] : Fully functional (bathing, dressing, toileting, transferring, walking, feeding) [Fully functional (using the telephone, shopping, preparing meals, housekeeping, doing laundry, using] : Fully functional and needs no help or supervision to perform IADLs (using the telephone, shopping, preparing meals, housekeeping, doing laundry, using transportation, managing medications and managing finances) [Audit-CScore] : 3 [de-identified] : 6/2023, tripped off a curb but no injury  [[id number]] : 0 [MammogramDate] : 07/24 [PapSmearComments] : s/p hysterectomy  [BoneDensityDate] : 07/24 [BoneDensityComments] : osteoporosis  [ColonoscopyDate] : 10 years ago  [de-identified] :

## 2024-07-30 NOTE — ASSESSMENT
[FreeTextEntry1] : Health Care Maintenance - well visit - routine labs reviewed from 5/2024; cbc, a1c. vit D ordered - depression screen negative - ekg- follows with Dr. Paredes - mammogram- 7/2024 - colonoscopy- 10 years ago, due, GI referral given - dexa 7/2024- osteoporosis- see below - flu vaccine- reports utd - covid vaccines- reports at least 5 doses  - tdap 1/2016 - shingles vaccine- reports 2 doses  - pneumonia vaccine- reports utd  - advised to get annual eye exams with optometry/ophthalmology, skin exams with dermatology, and dental exams - RTC for CPE in 1 year or sooner prn  HLD -c/w statin -check cmp and lipid panel  Osteoporosis  -previously on fosamax and other medications with AE, subsequently declined further screening or treatment; however, is now willing to consider other treatment options, s/p DEXA with gyn this month -endo referral given  Seasonal allergies -conservative measure including flonase bid, netipot, prn antihistamines discussed  Acid reflux -lifestyle modifications discussed including avoiding lying down 2-3 hours after eating, eating small, frequent meals, and minimizing potential food triggers (tomatoes/sauce, caffeine, alcohol, spicy foods, citrus foods, red meat, chocolate, mint, carbonated beverages -trial pepcid -GI if refractory

## 2024-07-30 NOTE — HISTORY OF PRESENT ILLNESS
[FreeTextEntry1] : cpe (Dr. Herrera patient) [de-identified] : SHADE OWEN is a 74 year F who presents for CPE PMHx vaginal atrophy, osteoporosis, HLD, migraines   Feels well overall Has cut down carbs significantly since bw 5/2024 showing A1C 5.8 Reports chronic seasonal allergies for decades. Symptoms including sneezing, nasal congestion, cough. Takes allegra with good relief.  Sometimes has reflux. Not taking any meds.   Cardio Dr. Paredes Gyn Dr. Jessica Barrett ([facility])

## 2024-07-30 NOTE — PHYSICAL EXAM
[No Acute Distress] : no acute distress [Well-Appearing] : well-appearing [Normal Sclera/Conjunctiva] : normal sclera/conjunctiva [EOMI] : extraocular movements intact [Normal Outer Ear/Nose] : the outer ears and nose were normal in appearance [Normal Oropharynx] : the oropharynx was normal [No Lymphadenopathy] : no lymphadenopathy [Supple] : supple [Thyroid Normal, No Nodules] : the thyroid was normal and there were no nodules present [No Respiratory Distress] : no respiratory distress  [No Accessory Muscle Use] : no accessory muscle use [Clear to Auscultation] : lungs were clear to auscultation bilaterally [Normal Rate] : normal rate  [Regular Rhythm] : with a regular rhythm [Normal S1, S2] : normal S1 and S2 [No Edema] : there was no peripheral edema [No Extremity Clubbing/Cyanosis] : no extremity clubbing/cyanosis [Soft] : abdomen soft [Non Tender] : non-tender [Non-distended] : non-distended [Normal Bowel Sounds] : normal bowel sounds [Normal Affect] : the affect was normal [Normal Insight/Judgement] : insight and judgment were intact [de-identified] : hearing aids noted  [de-identified] : varicose veins noted

## 2024-07-31 LAB
25(OH)D3 SERPL-MCNC: 26.5 NG/ML
ESTIMATED AVERAGE GLUCOSE: 114 MG/DL
HBA1C MFR BLD HPLC: 5.6 %
HCT VFR BLD CALC: 40 %
HGB BLD-MCNC: 13.2 G/DL
MCHC RBC-ENTMCNC: 32.3 PG
MCHC RBC-ENTMCNC: 33 GM/DL
MCV RBC AUTO: 97.8 FL
PLATELET # BLD AUTO: 213 K/UL
RBC # BLD: 4.09 M/UL
RBC # FLD: 12.4 %
WBC # FLD AUTO: 6.36 K/UL

## 2024-09-24 ENCOUNTER — LABORATORY RESULT (OUTPATIENT)
Age: 74
End: 2024-09-24

## 2024-09-24 ENCOUNTER — APPOINTMENT (OUTPATIENT)
Dept: CARDIOLOGY | Facility: CLINIC | Age: 74
End: 2024-09-24
Payer: COMMERCIAL

## 2024-09-24 VITALS
BODY MASS INDEX: 21.71 KG/M2 | SYSTOLIC BLOOD PRESSURE: 132 MMHG | HEART RATE: 63 BPM | DIASTOLIC BLOOD PRESSURE: 77 MMHG | HEIGHT: 62 IN | RESPIRATION RATE: 16 BRPM | WEIGHT: 118 LBS | OXYGEN SATURATION: 99 % | TEMPERATURE: 97.4 F

## 2024-09-24 DIAGNOSIS — I07.1 RHEUMATIC TRICUSPID INSUFFICIENCY: ICD-10-CM

## 2024-09-24 DIAGNOSIS — I34.0 NONRHEUMATIC MITRAL (VALVE) INSUFFICIENCY: ICD-10-CM

## 2024-09-24 DIAGNOSIS — I65.29 OCCLUSION AND STENOSIS OF UNSPECIFIED CAROTID ARTERY: ICD-10-CM

## 2024-09-24 DIAGNOSIS — E78.01 FAMILIAL HYPERCHOLESTEROLEMIA: ICD-10-CM

## 2024-09-24 PROCEDURE — 99214 OFFICE O/P EST MOD 30 MIN: CPT

## 2024-09-24 PROCEDURE — G2211 COMPLEX E/M VISIT ADD ON: CPT | Mod: NC

## 2024-09-24 NOTE — ASSESSMENT
[FreeTextEntry1] : This is a 74-year-old female with a past medical history significant for hypercholesterolemia, whitecoat hypertension, s/p back surgery (L4-L5), s/p hysterectomy, who comes in for lipid/cardiac consultation.  *She would like to make a note that Dr. Paredes is her cardiologist.  She has no history of rheumatic fever.   She does not drink excessive caffeine or alcohol. Her cardiac risk factors include hypercholesterolemia and family history of hypercholesterolemia (her mother and brother both have high cholesterol)   HPI: She is feeling generally well today and denies chest pain, dizziness, heart palpitations, recent episodes of syncope or falls, SOB, or dyspnea at this time.  Current Medications: Rosuvastatin 20 mg daily for cholesterol management.   BLOOD PRESSURE: -BP is well controlled in today's visit.   BLOOD WORK: -New blood work was done 09/24/2024 to evaluate lipid profile, CBC, BMP, hepatic function, A1C and TSH. -Lipid panel done May 2024 demonstrated triglyceride 58, cholesterol 179, HDL 88, LDL 73, non-HDL 91, LDL direct 80. -Lipid panel done November 2021 demonstrated triglycerides 65, cholesterol 163, HDL 93, LDL 57, non-HDL 70, LDL direct 59. -Lipid panel done June 6, 2023 demonstrated cholesterol 164, HDL 96, triglycerides 51, LDL direct 68, non-HDL cholesterol 68 mg/dL, LDL calculated 57 mg/dL and hemoglobin A1c of 5.7.    TESTING/REPORTS: -24-Hour BP monitor done June 2024 demonstrated normal overall average /65, average awake /66, average asleep /62.   -Electrocardiogram done May 28, 2024 demonstrated sinus bradycardia rate 58 bpm is otherwise remarkable for nonspecific ST wave changes.  -Electrocardiogram done November 28, 2023 demonstrated sinus bradycardia rate of 55 bpm is otherwise remarkable for juvenile T wave pattern in V1 and V2.  -EKG done 06/06/2023 which demonstrated regular sinus rhythm with nonspecific ST-T wave changes BPM of 58.  -EKG done 12/07/2022 which demonstrated regular sinus rhythm with nonspecific ST-T wave changes BPM of 67.  -The patient had a normal exercise stress test September 21, 2022.  -Echo Doppler examination done September 8, 2022 demonstrated mild mitral valve regurgitation, mild tricuspid valve regurgitation, minimal aortic valve regurgitation, aortic valve sclerosis, mild pulmonic valve regurgitation, normal ejection fraction 64%.    PLAN: -Patient will schedule echocardiogram doppler examination to evaluate murmur, left ventricular function, chamber size, and rule out hypertrophy. -She will schedule a bilateral carotid doppler to evaluate for carotid artery stenosis. -She will continue with her current medications and will contact the office if she is having any complaints between now and their next follow up appointment.   I have discussed the plan of care with SHADE OWEN and she will follow up in 4-6 months. They are compliant with all of their medications.     The patient understands that aerobic exercises must be increased to 40 minutes 4 times/week and a detailed discussion of lifestyle modification was done today.   The patient has a good understanding of the diagnosis, treatment plan and lifestyle modification.   They will contact me at the office for any questions with their care or any changes in their health status.   The patient was discussed with supervision physician Dr. Lauro Paredes at the time of the visit and the plan of care will be carried out as noted above.     KANCHAN Gee NP

## 2024-09-24 NOTE — REASON FOR VISIT
[CV Risk Factors and Non-Cardiac Disease] : CV risk factors and non-cardiac disease [Hyperlipidemia] : hyperlipidemia [FreeTextEntry3] : Dr. Herrera [FreeTextEntry1] : This is a 74-year-old female with past medical history significant for hypercholesterolemia, status post back surgery (L4-L5), status post hysterectomy, who comes in for lipid/cardiac follow up.  The patient feels well and has no complaints today. She denies chest pain, shortness of breath, dizziness or syncope. She has no history of rheumatic fever.  She does not drink excessive caffeine or alcohol.   Her cardiac risk factors include hypercholesterolemia and family history of hypercholesterolemia (her mother and brother both have high cholesterol).  BP 05/28/24: 130/87. Patient advised to wear 24 BP monitor. Blood work repeated in office 05/28/24   Bloodwork done 11/28/23 cholesterol 163, HDL 93, Triglycerides 65, LDL calc 57, non HDL 70, LDL direct 59 a1C 5.6  Bloodwork done 6/6/23: cholesterol 164, LDL calc 57, HDL 96, A1c 5.7% TTE 9/8/22 mild mitral tricuspid and pulmonic regurgitation   The patient had a normal exercise stress test September 21, 2022.

## 2024-09-24 NOTE — DISCUSSION/SUMMARY
[FreeTextEntry1] : Dr. Paredes-(PRIOR VISIT and PMH WITH Dr. Paredes): This is a 74-year-old female with past medical history significant for hypercholesterolemia, status post back surgery (L4-L5), status post hysterectomy, who comes in for lipid/cardiac follow-up evaluation. She denies chest pain, shortness of breath, dizziness or syncope.   She has no history of rheumatic fever.  She does not drink excessive caffeine or alcohol.  Her cardiac risk factors include hypercholesterolemia and family history of hypercholesterolemia (her mother and brother both have high cholesterol). Electrocardiogram done May 28, 2024 demonstrated sinus bradycardia rate 58 bpm is otherwise remarkable for nonspecific ST wave changes. The patient blood pressure is elevated today.  She reports that her blood pressure is always elevated when she is in a physician's office. I have asked that she schedule an ambulatory blood pressure monitor to rule out whitecoat hypertension. The patient will have new blood work done today for lipid panel, electrolytes, TSH and hemoglobin A1c. She feels that she has gained some weight and is motivated to work with a registered dietitian Electrocardiogram done November 28, 2023 demonstrated sinus bradycardia rate of 55 bpm is otherwise remarkable for juvenile T wave pattern in V1 and V2. Lipid panel done June 6, 2023 demonstrated cholesterol 164, HDL 96, triglycerides 51, LDL direct 68, non-HDL cholesterol 68 mg/dL, LDL calculated 57 mg/dL and hemoglobin A1c of 5.7. The patient will continue on her current dose of Crestor 20 mg daily. The patient had a normal exercise stress test September 21, 2022.  Echo Doppler examination done September 8, 2022 demonstrated mild mitral valve regurgitation, mild tricuspid valve regurgitation, minimal aortic valve regurgitation, aortic valve sclerosis, mild pulmonic valve regurgitation, normal ejection fraction 64%.  Blood work done June 23, 2022 demonstrated total cholesterol 294 mg/dL,  mg/dL, HDL of 85 mg/dL and total cholesterol 78 mg/dL. Lipid panel done June 23, 2022 demonstrated cholesterol 294, triglycerides 78, HDL 85, LDL calculated 193 mg/dL and non-HDL cholesterol 209 mg/dL. Blood work done July 29, 2020 demonstrated total cholesterol 269 mg/dL, HDL of 77 mg/dL, LDL calculated 179 mg/dL, and triglycerides of 70 mg/dL.  Given the possibility of heterozygous familial hypercholesterolemia, I would recommend an LDL target of less than 100 mg/dL.  I have recommended she work with our registered dietitian to improve her lipid profile, and her salt intake. She is encouraged to continue on a regular aerobic activity. She is instructed follow-up with her primary care physician.   The patient understands that aerobic exercises must be increased to 40 minutes 4 times per week. A detailed discussion of lifestyle modification was done today. The patient has a good understanding of the diagnosis, and treatment plan. Lifestyle modification was also outlined.  Thank you for allowing participate in care of your patient.  Please do not hesitate to call if you have any further questions.

## 2024-09-26 ENCOUNTER — TRANSCRIPTION ENCOUNTER (OUTPATIENT)
Age: 74
End: 2024-09-26

## 2024-09-30 ENCOUNTER — RESULT CHARGE (OUTPATIENT)
Age: 74
End: 2024-09-30

## 2024-09-30 ENCOUNTER — APPOINTMENT (OUTPATIENT)
Dept: UROGYNECOLOGY | Facility: CLINIC | Age: 74
End: 2024-09-30
Payer: COMMERCIAL

## 2024-09-30 DIAGNOSIS — R35.0 FREQUENCY OF MICTURITION: ICD-10-CM

## 2024-09-30 DIAGNOSIS — N39.0 URINARY TRACT INFECTION, SITE NOT SPECIFIED: ICD-10-CM

## 2024-09-30 LAB
BILIRUB UR QL STRIP: NEGATIVE
CLARITY UR: CLEAR
COLLECTION METHOD: NORMAL
GLUCOSE UR-MCNC: NORMAL
HCG UR QL: 1 EU/DL
HGB UR QL STRIP.AUTO: NORMAL
KETONES UR-MCNC: NORMAL
LEUKOCYTE ESTERASE UR QL STRIP: NEGATIVE
NITRITE UR QL STRIP: POSITIVE
PH UR STRIP: 5.5
PROT UR STRIP-MCNC: NORMAL
SP GR UR STRIP: 1.03

## 2024-09-30 PROCEDURE — 81003 URINALYSIS AUTO W/O SCOPE: CPT | Mod: QW

## 2024-09-30 PROCEDURE — 51701 INSERT BLADDER CATHETER: CPT

## 2024-09-30 PROCEDURE — 99214 OFFICE O/P EST MOD 30 MIN: CPT | Mod: 25

## 2024-09-30 RX ORDER — CEPHALEXIN 500 MG/1
500 TABLET ORAL
Qty: 14 | Refills: 0 | Status: ACTIVE | COMMUNITY
Start: 2024-09-30 | End: 1900-01-01

## 2024-09-30 NOTE — HISTORY OF PRESENT ILLNESS
[FreeTextEntry1] : Tiff is 75 yo with known history of vaginal atrophy, recurrent UTI, and OAB.  She presents today for UTI symptoms.  For her vaginal atrophy and history of recurrent UTI, she has been using vaginal estrogen three times per week.  She is very happy with the outcome of this treatment and has been doing well for last few years but this morning she woke up at 5AM with pelvic discomfort.  She took Uristat for symptom relief and it helped her until 12PM when she started to urinate every 30 minutes.  She denies burning with urination, cloudy or foul-smelling urine, hematuria, back pain, fever, or chills. For her OAB, she has been adhering to the OAB diet and behavioral modification.  She reports that some days are better than others. She experiences occasional UUI.

## 2024-09-30 NOTE — PHYSICAL EXAM
[No Acute Distress] : in no acute distress [Well developed] : well developed [Well Nourished] : ~L well nourished [Good Hygeine] : demonstrates good hygeine [Oriented x3] : oriented to person, place, and time [Normal Memory] : ~T memory was ~L unimpaired [Normal Mood/Affect] : mood and affect are normal [Warm and Dry] : was warm and dry to touch [Normal Gait] : gait was normal [Vulvar Atrophy] : vulvar atrophy [Labia Majora] : were normal [Labia Minora] : were normal [Normal] : was normal [Atrophy] : atrophy [Dry Mucosa] : dry mucosa [No Bleeding] : there was no active vaginal bleeding [Anxiety] : patient is not anxious

## 2024-09-30 NOTE — DISCUSSION/SUMMARY
[FreeTextEntry1] : #Acute UTI symptoms: - Udip significant for glucose, ketones, trace blood, proteins and positive nitrates.  Will send out for formal UA and UCx and follow-up. - Will treat pt empirically with Keflex BID x 7 days due to pt being symptomatic and positive Udip.  Pt understands that depending on the sensitivities, we may have to switch antibiotics.  - Pt may take Azo / Uristat for symptoms.  Continue to hydrate well.  - Routine perineal care reviewed.  Continue low dose vaginal estrogen tablets.   - Reviewed s/s of worsening UTI symptoms such as hematuria, fever, chills and back pain.  Pt knows to contact our office or present to ED for such symptoms.   #OAB: - Reviewed behavioral modifications and bladder training.   - Pt will continue conservative managements for now.   #Dispo: -RTO in 12 months or sooner if needed  All questions answered to the patient's satisfaction.  She expressed understanding. She knows to contact the office with any questions or concerns.

## 2024-09-30 NOTE — PROCEDURE
[Straight Catheterization] : insertion of a straight catheter [Urinary Tract Infection] : a urinary tract infection [Patient] : the patient [Allergies Reviewed] : Allergies reviewed [None] : none [___ Fr Straight Tip] : a [unfilled] in [place] straight tip catheter [Clear] : clear [Other: ___] : [unfilled] [Culture] : culture [Urinalysis] : urinalysis [No Complications] : no complications [Tolerated Well] : the patient tolerated the procedure well [Post procedure instructions and information given] : Post procedure instructions and information were given and reviewed with patient. [0] : 0

## 2024-10-02 ENCOUNTER — NON-APPOINTMENT (OUTPATIENT)
Age: 74
End: 2024-10-02

## 2024-10-02 LAB
APPEARANCE: CLEAR
BACTERIA UR CULT: NORMAL
BACTERIA: NEGATIVE /HPF
BILIRUBIN URINE: NEGATIVE
BLOOD URINE: NEGATIVE
CAST: 0 /LPF
COLOR: ABNORMAL
EPITHELIAL CELLS: 1 /HPF
GLUCOSE QUALITATIVE U: NEGATIVE MG/DL
KETONES URINE: ABNORMAL MG/DL
LEUKOCYTE ESTERASE URINE: ABNORMAL
MICROSCOPIC-UA: NORMAL
NITRITE URINE: POSITIVE
PH URINE: 5.5
PROTEIN URINE: NEGATIVE MG/DL
RED BLOOD CELLS URINE: 4 /HPF
REVIEW: NORMAL
SPECIFIC GRAVITY URINE: 1.03
UROBILINOGEN URINE: 1 MG/DL
WHITE BLOOD CELLS URINE: 0 /HPF

## 2024-12-16 ENCOUNTER — RX RENEWAL (OUTPATIENT)
Age: 74
End: 2024-12-16

## 2024-12-18 ENCOUNTER — NON-APPOINTMENT (OUTPATIENT)
Age: 74
End: 2024-12-18

## 2024-12-18 ENCOUNTER — APPOINTMENT (OUTPATIENT)
Dept: ORTHOPEDIC SURGERY | Facility: CLINIC | Age: 74
End: 2024-12-18
Payer: COMMERCIAL

## 2024-12-18 VITALS — BODY MASS INDEX: 21.71 KG/M2 | WEIGHT: 118 LBS | HEIGHT: 62 IN

## 2024-12-18 DIAGNOSIS — M47.812 SPONDYLOSIS W/OUT MYELOPATHY OR RADICULOPATHY, CERVICAL REGION: ICD-10-CM

## 2024-12-18 DIAGNOSIS — M48.02 SPINAL STENOSIS, CERVICAL REGION: ICD-10-CM

## 2024-12-18 DIAGNOSIS — M25.812 OTHER SPECIFIED JOINT DISORDERS, LEFT SHOULDER: ICD-10-CM

## 2024-12-18 DIAGNOSIS — M25.811 OTHER SPECIFIED JOINT DISORDERS, RIGHT SHOULDER: ICD-10-CM

## 2024-12-18 PROCEDURE — 72040 X-RAY EXAM NECK SPINE 2-3 VW: CPT

## 2024-12-18 PROCEDURE — 73030 X-RAY EXAM OF SHOULDER: CPT | Mod: 50

## 2024-12-18 PROCEDURE — 99204 OFFICE O/P NEW MOD 45 MIN: CPT

## 2024-12-18 RX ORDER — CYCLOBENZAPRINE HYDROCHLORIDE 5 MG/1
5 TABLET, FILM COATED ORAL
Qty: 45 | Refills: 0 | Status: ACTIVE | COMMUNITY
Start: 2024-12-18 | End: 1900-01-01

## 2024-12-18 RX ORDER — METHYLPREDNISOLONE 4 MG/1
4 TABLET ORAL
Qty: 1 | Refills: 0 | Status: ACTIVE | COMMUNITY
Start: 2024-12-18 | End: 1900-01-01

## 2024-12-19 PROBLEM — M47.812 CERVICAL SPONDYLOSIS: Status: ACTIVE | Noted: 2024-12-19

## 2025-01-06 ENCOUNTER — APPOINTMENT (OUTPATIENT)
Dept: ORTHOPEDIC SURGERY | Facility: CLINIC | Age: 75
End: 2025-01-06

## 2025-01-06 ENCOUNTER — APPOINTMENT (OUTPATIENT)
Dept: ORTHOPEDIC SURGERY | Facility: CLINIC | Age: 75
End: 2025-01-06
Payer: COMMERCIAL

## 2025-01-06 DIAGNOSIS — M23.92 UNSPECIFIED INTERNAL DERANGEMENT OF LEFT KNEE: ICD-10-CM

## 2025-01-06 DIAGNOSIS — R10.2 PELVIC AND PERINEAL PAIN: ICD-10-CM

## 2025-01-06 PROCEDURE — 73564 X-RAY EXAM KNEE 4 OR MORE: CPT | Mod: 50

## 2025-01-06 PROCEDURE — 73522 X-RAY EXAM HIPS BI 3-4 VIEWS: CPT

## 2025-01-06 PROCEDURE — 99214 OFFICE O/P EST MOD 30 MIN: CPT

## 2025-01-15 ENCOUNTER — APPOINTMENT (OUTPATIENT)
Dept: ORTHOPEDIC SURGERY | Facility: CLINIC | Age: 75
End: 2025-01-15
Payer: COMMERCIAL

## 2025-01-15 VITALS — WEIGHT: 118 LBS | BODY MASS INDEX: 21.71 KG/M2 | HEIGHT: 62 IN

## 2025-01-15 DIAGNOSIS — S82.142A DISPLACED BICONDYLAR FRACTURE OF LEFT TIBIA, INITIAL ENCOUNTER FOR CLOSED FRACTURE: ICD-10-CM

## 2025-01-15 PROCEDURE — 99214 OFFICE O/P EST MOD 30 MIN: CPT

## 2025-01-29 ENCOUNTER — APPOINTMENT (OUTPATIENT)
Dept: ORTHOPEDIC SURGERY | Facility: CLINIC | Age: 75
End: 2025-01-29
Payer: COMMERCIAL

## 2025-01-29 VITALS — HEIGHT: 61 IN | WEIGHT: 118 LBS | BODY MASS INDEX: 22.28 KG/M2

## 2025-01-29 DIAGNOSIS — S32.10XA UNSPECIFIED FRACTURE OF SACRUM, INITIAL ENCOUNTER FOR CLOSED FRACTURE: ICD-10-CM

## 2025-01-29 DIAGNOSIS — S32.599A OTHER SPECIFIED FRACTURE OF UNSPECIFIED PUBIS, INITIAL ENCOUNTER FOR CLOSED FRACTURE: ICD-10-CM

## 2025-01-29 DIAGNOSIS — M22.2X1 PATELLOFEMORAL DISORDERS, RIGHT KNEE: ICD-10-CM

## 2025-01-29 DIAGNOSIS — S82.142A DISPLACED BICONDYLAR FRACTURE OF LEFT TIBIA, INITIAL ENCOUNTER FOR CLOSED FRACTURE: ICD-10-CM

## 2025-01-29 PROCEDURE — 99214 OFFICE O/P EST MOD 30 MIN: CPT

## 2025-01-29 PROCEDURE — 73560 X-RAY EXAM OF KNEE 1 OR 2: CPT | Mod: LT

## 2025-01-29 RX ORDER — TRAMADOL HYDROCHLORIDE 50 MG/1
50 TABLET, COATED ORAL
Qty: 35 | Refills: 0 | Status: ACTIVE | COMMUNITY
Start: 2025-01-29 | End: 1900-01-01

## 2025-02-05 RX ORDER — LIDOCAINE 5 G/100G
5 OINTMENT TOPICAL
Qty: 1 | Refills: 0 | Status: ACTIVE | COMMUNITY
Start: 2025-02-05 | End: 1900-01-01

## 2025-02-10 ENCOUNTER — APPOINTMENT (OUTPATIENT)
Dept: INTERNAL MEDICINE | Facility: CLINIC | Age: 75
End: 2025-02-10

## 2025-02-25 ENCOUNTER — APPOINTMENT (OUTPATIENT)
Dept: ENDOCRINOLOGY | Facility: CLINIC | Age: 75
End: 2025-02-25
Payer: COMMERCIAL

## 2025-02-25 VITALS
OXYGEN SATURATION: 95 % | BODY MASS INDEX: 22.66 KG/M2 | HEIGHT: 61 IN | HEART RATE: 105 BPM | DIASTOLIC BLOOD PRESSURE: 72 MMHG | SYSTOLIC BLOOD PRESSURE: 130 MMHG | WEIGHT: 120 LBS

## 2025-02-25 DIAGNOSIS — S32.10XA UNSPECIFIED FRACTURE OF SACRUM, INITIAL ENCOUNTER FOR CLOSED FRACTURE: ICD-10-CM

## 2025-02-25 PROCEDURE — G2211 COMPLEX E/M VISIT ADD ON: CPT | Mod: NC

## 2025-02-25 PROCEDURE — 99205 OFFICE O/P NEW HI 60 MIN: CPT

## 2025-02-26 LAB
25(OH)D3 SERPL-MCNC: 43.8 NG/ML
ALBUMIN MFR SERPL ELPH: 63 %
ALBUMIN SERPL ELPH-MCNC: 4.8 G/DL
ALBUMIN SERPL-MCNC: 4.5 G/DL
ALBUMIN/GLOB SERPL: 1.7 RATIO
ALP BLD-CCNC: 100 U/L
ALPHA1 GLOB MFR SERPL ELPH: 5 %
ALPHA1 GLOB SERPL ELPH-MCNC: 0.4 G/DL
ALPHA2 GLOB MFR SERPL ELPH: 11.8 %
ALPHA2 GLOB SERPL ELPH-MCNC: 0.8 G/DL
ALT SERPL-CCNC: 17 U/L
ANION GAP SERPL CALC-SCNC: 17 MMOL/L
AST SERPL-CCNC: 19 U/L
B-GLOBULIN MFR SERPL ELPH: 10 %
B-GLOBULIN SERPL ELPH-MCNC: 0.7 G/DL
BILIRUB SERPL-MCNC: 0.3 MG/DL
BUN SERPL-MCNC: 23 MG/DL
CALCIUM SERPL-MCNC: 10.2 MG/DL
CALCIUM SERPL-MCNC: 10.2 MG/DL
CHLORIDE SERPL-SCNC: 102 MMOL/L
CO2 SERPL-SCNC: 24 MMOL/L
CREAT SERPL-MCNC: 0.73 MG/DL
EGFR: 86 ML/MIN/1.73M2
GAMMA GLOB FLD ELPH-MCNC: 0.7 G/DL
GAMMA GLOB MFR SERPL ELPH: 10.2 %
GLUCOSE SERPL-MCNC: 90 MG/DL
INTERPRETATION SERPL IEP-IMP: NORMAL
MAGNESIUM SERPL-MCNC: 2.4 MG/DL
PARATHYROID HORMONE INTACT: 32 PG/ML
PHOSPHATE SERPL-MCNC: 4.6 MG/DL
POTASSIUM SERPL-SCNC: 4.4 MMOL/L
PROT SERPL-MCNC: 7.1 G/DL
SODIUM SERPL-SCNC: 143 MMOL/L
T4 FREE SERPL-MCNC: 1.2 NG/DL
TSH SERPL-ACNC: 2.01 UIU/ML

## 2025-02-28 LAB — PROPEPTIDE TYPE I COLLAGEN: 60.1 NG/ML

## 2025-02-28 RX ORDER — ABALOPARATIDE 2000 UG/ML
3120 INJECTION, SOLUTION SUBCUTANEOUS
Qty: 3 | Refills: 3 | Status: ACTIVE | COMMUNITY
Start: 2025-02-25 | End: 1900-01-01

## 2025-02-28 RX ORDER — PEN NEEDLE, DIABETIC 31 GX5/16"
31G X 5 MM NEEDLE, DISPOSABLE MISCELLANEOUS
Qty: 90 | Refills: 3 | Status: ACTIVE | COMMUNITY
Start: 2025-02-25 | End: 1900-01-01

## 2025-03-05 ENCOUNTER — APPOINTMENT (OUTPATIENT)
Dept: ORTHOPEDIC SURGERY | Facility: CLINIC | Age: 75
End: 2025-03-05
Payer: COMMERCIAL

## 2025-03-05 ENCOUNTER — APPOINTMENT (OUTPATIENT)
Dept: ENDOCRINOLOGY | Facility: CLINIC | Age: 75
End: 2025-03-05
Payer: COMMERCIAL

## 2025-03-05 DIAGNOSIS — S82.143A DISPLACED BICONDYLAR FRACTURE OF UNSPECIFIED TIBIA, INITIAL ENCOUNTER FOR CLOSED FRACTURE: ICD-10-CM

## 2025-03-05 DIAGNOSIS — M81.0 AGE-RELATED OSTEOPOROSIS W/OUT CURRENT PATHOLOGICAL FRACTURE: ICD-10-CM

## 2025-03-05 DIAGNOSIS — S32.599A OTHER SPECIFIED FRACTURE OF UNSPECIFIED PUBIS, INITIAL ENCOUNTER FOR CLOSED FRACTURE: ICD-10-CM

## 2025-03-05 DIAGNOSIS — S82.142A DISPLACED BICONDYLAR FRACTURE OF LEFT TIBIA, INITIAL ENCOUNTER FOR CLOSED FRACTURE: ICD-10-CM

## 2025-03-05 LAB — COLLAGEN CTX SERPL-MCNC: 376 PG/ML

## 2025-03-05 PROCEDURE — 99214 OFFICE O/P EST MOD 30 MIN: CPT

## 2025-03-05 PROCEDURE — 99211 OFF/OP EST MAY X REQ PHY/QHP: CPT

## 2025-03-05 PROCEDURE — 73522 X-RAY EXAM HIPS BI 3-4 VIEWS: CPT

## 2025-03-05 PROCEDURE — 73564 X-RAY EXAM KNEE 4 OR MORE: CPT | Mod: LT

## 2025-03-25 ENCOUNTER — LABORATORY RESULT (OUTPATIENT)
Age: 75
End: 2025-03-25

## 2025-03-25 ENCOUNTER — APPOINTMENT (OUTPATIENT)
Dept: CARDIOLOGY | Facility: CLINIC | Age: 75
End: 2025-03-25
Payer: COMMERCIAL

## 2025-03-25 ENCOUNTER — NON-APPOINTMENT (OUTPATIENT)
Age: 75
End: 2025-03-25

## 2025-03-25 VITALS
WEIGHT: 115 LBS | SYSTOLIC BLOOD PRESSURE: 126 MMHG | BODY MASS INDEX: 21.71 KG/M2 | RESPIRATION RATE: 16 BRPM | HEART RATE: 66 BPM | TEMPERATURE: 97.9 F | HEIGHT: 61 IN | DIASTOLIC BLOOD PRESSURE: 82 MMHG | OXYGEN SATURATION: 97 %

## 2025-03-25 DIAGNOSIS — E78.00 PURE HYPERCHOLESTEROLEMIA, UNSPECIFIED: ICD-10-CM

## 2025-03-25 DIAGNOSIS — I34.0 NONRHEUMATIC MITRAL (VALVE) INSUFFICIENCY: ICD-10-CM

## 2025-03-25 DIAGNOSIS — I38 ENDOCARDITIS, VALVE UNSPECIFIED: ICD-10-CM

## 2025-03-25 PROCEDURE — 99214 OFFICE O/P EST MOD 30 MIN: CPT

## 2025-03-25 PROCEDURE — 93000 ELECTROCARDIOGRAM COMPLETE: CPT

## 2025-03-25 PROCEDURE — 93880 EXTRACRANIAL BILAT STUDY: CPT

## 2025-03-25 PROCEDURE — G2211 COMPLEX E/M VISIT ADD ON: CPT | Mod: NC

## 2025-03-31 ENCOUNTER — APPOINTMENT (OUTPATIENT)
Dept: ENDOCRINOLOGY | Facility: CLINIC | Age: 75
End: 2025-03-31
Payer: COMMERCIAL

## 2025-03-31 VITALS
HEART RATE: 77 BPM | OXYGEN SATURATION: 97 % | HEIGHT: 61 IN | DIASTOLIC BLOOD PRESSURE: 82 MMHG | BODY MASS INDEX: 22.84 KG/M2 | WEIGHT: 121 LBS | SYSTOLIC BLOOD PRESSURE: 140 MMHG

## 2025-03-31 DIAGNOSIS — M81.0 AGE-RELATED OSTEOPOROSIS W/OUT CURRENT PATHOLOGICAL FRACTURE: ICD-10-CM

## 2025-03-31 DIAGNOSIS — R73.03 PREDIABETES.: ICD-10-CM

## 2025-03-31 PROCEDURE — 99214 OFFICE O/P EST MOD 30 MIN: CPT

## 2025-04-01 LAB
CALCIUM SERPL-MCNC: 9.7 MG/DL
PARATHYROID HORMONE INTACT: 36 PG/ML

## 2025-04-04 LAB — PROPEPTIDE TYPE I COLLAGEN: 121.4 NG/ML

## 2025-05-05 ENCOUNTER — APPOINTMENT (OUTPATIENT)
Dept: ORTHOPEDIC SURGERY | Facility: CLINIC | Age: 75
End: 2025-05-05
Payer: COMMERCIAL

## 2025-05-05 VITALS — WEIGHT: 121 LBS | HEIGHT: 61 IN | BODY MASS INDEX: 22.84 KG/M2

## 2025-05-05 DIAGNOSIS — S32.10XA UNSPECIFIED FRACTURE OF SACRUM, INITIAL ENCOUNTER FOR CLOSED FRACTURE: ICD-10-CM

## 2025-05-05 DIAGNOSIS — M25.811 OTHER SPECIFIED JOINT DISORDERS, RIGHT SHOULDER: ICD-10-CM

## 2025-05-05 DIAGNOSIS — S82.142A DISPLACED BICONDYLAR FRACTURE OF LEFT TIBIA, INITIAL ENCOUNTER FOR CLOSED FRACTURE: ICD-10-CM

## 2025-05-05 DIAGNOSIS — S32.599A OTHER SPECIFIED FRACTURE OF UNSPECIFIED PUBIS, INITIAL ENCOUNTER FOR CLOSED FRACTURE: ICD-10-CM

## 2025-05-05 DIAGNOSIS — M25.812 OTHER SPECIFIED JOINT DISORDERS, LEFT SHOULDER: ICD-10-CM

## 2025-05-05 PROCEDURE — 99214 OFFICE O/P EST MOD 30 MIN: CPT

## 2025-05-05 RX ORDER — HYALURONATE SODIUM, STABILIZED 88 MG/4 ML
88 SYRINGE (ML) INTRAARTICULAR
Qty: 2 | Refills: 0 | Status: ACTIVE | COMMUNITY
Start: 2025-05-05

## 2025-05-16 ENCOUNTER — APPOINTMENT (OUTPATIENT)
Dept: ORTHOPEDIC SURGERY | Facility: CLINIC | Age: 75
End: 2025-05-16
Payer: COMMERCIAL

## 2025-05-16 VITALS — BODY MASS INDEX: 22.84 KG/M2 | WEIGHT: 121 LBS | HEIGHT: 61 IN

## 2025-05-16 DIAGNOSIS — M17.0 BILATERAL PRIMARY OSTEOARTHRITIS OF KNEE: ICD-10-CM

## 2025-05-16 PROCEDURE — 20610 DRAIN/INJ JOINT/BURSA W/O US: CPT | Mod: 50

## 2025-05-16 PROCEDURE — 99213 OFFICE O/P EST LOW 20 MIN: CPT | Mod: 25

## 2025-07-01 ENCOUNTER — APPOINTMENT (OUTPATIENT)
Dept: ENDOCRINOLOGY | Facility: CLINIC | Age: 75
End: 2025-07-01
Payer: MEDICARE

## 2025-07-01 VITALS
HEIGHT: 61 IN | DIASTOLIC BLOOD PRESSURE: 80 MMHG | HEART RATE: 75 BPM | OXYGEN SATURATION: 95 % | BODY MASS INDEX: 23.03 KG/M2 | WEIGHT: 122 LBS | SYSTOLIC BLOOD PRESSURE: 130 MMHG

## 2025-07-01 PROCEDURE — G2211 COMPLEX E/M VISIT ADD ON: CPT

## 2025-07-01 PROCEDURE — 99204 OFFICE O/P NEW MOD 45 MIN: CPT

## 2025-07-02 LAB
ALBUMIN SERPL ELPH-MCNC: 4.8 G/DL
ALP BLD-CCNC: 78 U/L
ALT SERPL-CCNC: 24 U/L
ANION GAP SERPL CALC-SCNC: 15 MMOL/L
AST SERPL-CCNC: 22 U/L
BILIRUB SERPL-MCNC: 0.2 MG/DL
BUN SERPL-MCNC: 30 MG/DL
CALCIUM SERPL-MCNC: 9.8 MG/DL
CHLORIDE SERPL-SCNC: 106 MMOL/L
CO2 SERPL-SCNC: 23 MMOL/L
CREAT SERPL-MCNC: 0.99 MG/DL
EGFRCR SERPLBLD CKD-EPI 2021: 60 ML/MIN/1.73M2
GLUCOSE SERPL-MCNC: 101 MG/DL
POTASSIUM SERPL-SCNC: 4.6 MMOL/L
PROT SERPL-MCNC: 7 G/DL
SODIUM SERPL-SCNC: 144 MMOL/L

## 2025-08-05 ENCOUNTER — APPOINTMENT (OUTPATIENT)
Dept: MAMMOGRAPHY | Facility: CLINIC | Age: 75
End: 2025-08-05
Payer: MEDICARE

## 2025-08-05 ENCOUNTER — APPOINTMENT (OUTPATIENT)
Dept: ULTRASOUND IMAGING | Facility: CLINIC | Age: 75
End: 2025-08-05
Payer: MEDICARE

## 2025-08-05 PROCEDURE — 77063 BREAST TOMOSYNTHESIS BI: CPT

## 2025-08-05 PROCEDURE — 77067 SCR MAMMO BI INCL CAD: CPT

## 2025-08-05 PROCEDURE — 76641 ULTRASOUND BREAST COMPLETE: CPT | Mod: 50,GA

## 2025-09-09 ENCOUNTER — LABORATORY RESULT (OUTPATIENT)
Age: 75
End: 2025-09-09

## 2025-09-09 ENCOUNTER — APPOINTMENT (OUTPATIENT)
Dept: CARDIOLOGY | Facility: CLINIC | Age: 75
End: 2025-09-09
Payer: MEDICARE

## 2025-09-09 VITALS
HEIGHT: 61 IN | SYSTOLIC BLOOD PRESSURE: 110 MMHG | BODY MASS INDEX: 23.03 KG/M2 | WEIGHT: 122 LBS | DIASTOLIC BLOOD PRESSURE: 72 MMHG | TEMPERATURE: 97.1 F | OXYGEN SATURATION: 99 % | HEART RATE: 53 BPM | RESPIRATION RATE: 16 BRPM

## 2025-09-09 DIAGNOSIS — E78.00 PURE HYPERCHOLESTEROLEMIA, UNSPECIFIED: ICD-10-CM

## 2025-09-09 DIAGNOSIS — I07.1 RHEUMATIC TRICUSPID INSUFFICIENCY: ICD-10-CM

## 2025-09-09 DIAGNOSIS — I34.0 NONRHEUMATIC MITRAL (VALVE) INSUFFICIENCY: ICD-10-CM

## 2025-09-09 DIAGNOSIS — R73.03 PREDIABETES.: ICD-10-CM

## 2025-09-09 DIAGNOSIS — E78.01 FAMILIAL HYPERCHOLESTEROLEMIA: ICD-10-CM

## 2025-09-09 PROCEDURE — 93306 TTE W/DOPPLER COMPLETE: CPT

## 2025-09-09 PROCEDURE — G2211 COMPLEX E/M VISIT ADD ON: CPT

## 2025-09-09 PROCEDURE — 93000 ELECTROCARDIOGRAM COMPLETE: CPT

## 2025-09-09 PROCEDURE — 99214 OFFICE O/P EST MOD 30 MIN: CPT
